# Patient Record
Sex: FEMALE | Race: WHITE | Employment: FULL TIME | URBAN - METROPOLITAN AREA
[De-identification: names, ages, dates, MRNs, and addresses within clinical notes are randomized per-mention and may not be internally consistent; named-entity substitution may affect disease eponyms.]

---

## 2018-05-09 DIAGNOSIS — E03.8 OTHER SPECIFIED HYPOTHYROIDISM: Primary | ICD-10-CM

## 2018-05-16 ENCOUNTER — HOSPITAL ENCOUNTER (OUTPATIENT)
Age: 53
Discharge: HOME | End: 2018-05-16
Payer: COMMERCIAL

## 2018-05-16 DIAGNOSIS — E03.8 OTHER SPECIFIED HYPOTHYROIDISM: ICD-10-CM

## 2018-05-16 LAB — TSH SERPL DL<=0.05 MIU/L-ACNC: 7.35 UIU/ML (ref 0.27–4.2)

## 2018-05-16 PROCEDURE — 84443 ASSAY THYROID STIM HORMONE: CPT

## 2018-05-16 PROCEDURE — 36415 COLL VENOUS BLD VENIPUNCTURE: CPT

## 2018-05-20 DIAGNOSIS — E03.9 ACQUIRED HYPOTHYROIDISM: ICD-10-CM

## 2018-05-20 RX ORDER — LEVOTHYROXINE SODIUM 0.15 MG/1
150 TABLET ORAL DAILY
Qty: 30 TABLET | Refills: 1 | Status: SHIPPED | OUTPATIENT
Start: 2018-05-20 | End: 2018-08-01 | Stop reason: SDUPTHER

## 2018-06-10 ENCOUNTER — OFFICE VISIT (OUTPATIENT)
Dept: FAMILY MEDICINE CLINIC | Age: 53
End: 2018-06-10
Payer: COMMERCIAL

## 2018-06-10 VITALS
RESPIRATION RATE: 18 BRPM | WEIGHT: 247 LBS | BODY MASS INDEX: 39.7 KG/M2 | HEART RATE: 83 BPM | DIASTOLIC BLOOD PRESSURE: 78 MMHG | TEMPERATURE: 98 F | HEIGHT: 66 IN | SYSTOLIC BLOOD PRESSURE: 130 MMHG | OXYGEN SATURATION: 97 %

## 2018-06-10 DIAGNOSIS — B02.9 HERPES ZOSTER WITHOUT COMPLICATION: Primary | ICD-10-CM

## 2018-06-10 PROCEDURE — 3017F COLORECTAL CA SCREEN DOC REV: CPT | Performed by: PHYSICIAN ASSISTANT

## 2018-06-10 PROCEDURE — 3014F SCREEN MAMMO DOC REV: CPT | Performed by: PHYSICIAN ASSISTANT

## 2018-06-10 PROCEDURE — 1036F TOBACCO NON-USER: CPT | Performed by: PHYSICIAN ASSISTANT

## 2018-06-10 PROCEDURE — G8417 CALC BMI ABV UP PARAM F/U: HCPCS | Performed by: PHYSICIAN ASSISTANT

## 2018-06-10 PROCEDURE — 99213 OFFICE O/P EST LOW 20 MIN: CPT | Performed by: PHYSICIAN ASSISTANT

## 2018-06-10 PROCEDURE — G8427 DOCREV CUR MEDS BY ELIG CLIN: HCPCS | Performed by: PHYSICIAN ASSISTANT

## 2018-06-10 RX ORDER — PREDNISONE 20 MG/1
40 TABLET ORAL DAILY
Qty: 10 TABLET | Refills: 0 | Status: SHIPPED | OUTPATIENT
Start: 2018-06-10 | End: 2018-06-15

## 2018-07-09 ENCOUNTER — OFFICE VISIT (OUTPATIENT)
Dept: FAMILY MEDICINE CLINIC | Age: 53
End: 2018-07-09
Payer: COMMERCIAL

## 2018-07-09 ENCOUNTER — HOSPITAL ENCOUNTER (OUTPATIENT)
Age: 53
Discharge: HOME | End: 2018-07-11
Payer: COMMERCIAL

## 2018-07-09 VITALS
DIASTOLIC BLOOD PRESSURE: 73 MMHG | HEART RATE: 78 BPM | TEMPERATURE: 98.6 F | BODY MASS INDEX: 39.7 KG/M2 | RESPIRATION RATE: 14 BRPM | WEIGHT: 247 LBS | SYSTOLIC BLOOD PRESSURE: 128 MMHG | OXYGEN SATURATION: 97 % | HEIGHT: 66 IN

## 2018-07-09 DIAGNOSIS — Z82.49 FAMILY HISTORY OF CORONARY ARTERY DISEASE: ICD-10-CM

## 2018-07-09 DIAGNOSIS — K21.9 GASTROESOPHAGEAL REFLUX DISEASE WITHOUT ESOPHAGITIS: ICD-10-CM

## 2018-07-09 DIAGNOSIS — E03.9 HYPOTHYROIDISM, UNSPECIFIED TYPE: Primary | ICD-10-CM

## 2018-07-09 DIAGNOSIS — E03.9 HYPOTHYROIDISM, UNSPECIFIED TYPE: ICD-10-CM

## 2018-07-09 DIAGNOSIS — H61.22 LEFT EAR IMPACTED CERUMEN: ICD-10-CM

## 2018-07-09 LAB
ALBUMIN SERPL-MCNC: 4.2 G/DL (ref 3.5–5.2)
ALP BLD-CCNC: 73 U/L (ref 35–104)
ALT SERPL-CCNC: 22 U/L (ref 0–32)
ANION GAP SERPL CALCULATED.3IONS-SCNC: 16 MMOL/L (ref 7–16)
AST SERPL-CCNC: 22 U/L (ref 0–31)
BILIRUB SERPL-MCNC: 0.5 MG/DL (ref 0–1.2)
BUN BLDV-MCNC: 10 MG/DL (ref 6–20)
CALCIUM SERPL-MCNC: 9 MG/DL (ref 8.6–10.2)
CHLORIDE BLD-SCNC: 102 MMOL/L (ref 98–107)
CHOLESTEROL, TOTAL: 187 MG/DL (ref 0–199)
CO2: 21 MMOL/L (ref 22–29)
CREAT SERPL-MCNC: 0.7 MG/DL (ref 0.5–1)
GFR AFRICAN AMERICAN: >60
GFR NON-AFRICAN AMERICAN: >60 ML/MIN/1.73
GLUCOSE BLD-MCNC: 92 MG/DL (ref 74–109)
HCT VFR BLD CALC: 43.3 % (ref 34–48)
HDLC SERPL-MCNC: 50 MG/DL
HEMOGLOBIN: 14.2 G/DL (ref 11.5–15.5)
LDL CHOLESTEROL CALCULATED: 117 MG/DL (ref 0–99)
MCH RBC QN AUTO: 28.6 PG (ref 26–35)
MCHC RBC AUTO-ENTMCNC: 32.8 % (ref 32–34.5)
MCV RBC AUTO: 87.3 FL (ref 80–99.9)
PDW BLD-RTO: 14.1 FL (ref 11.5–15)
PLATELET # BLD: 202 E9/L (ref 130–450)
PMV BLD AUTO: 10.7 FL (ref 7–12)
POTASSIUM SERPL-SCNC: 4.6 MMOL/L (ref 3.5–5)
RBC # BLD: 4.96 E12/L (ref 3.5–5.5)
SODIUM BLD-SCNC: 139 MMOL/L (ref 132–146)
TOTAL PROTEIN: 7.2 G/DL (ref 6.4–8.3)
TRIGL SERPL-MCNC: 98 MG/DL (ref 0–149)
TSH SERPL DL<=0.05 MIU/L-ACNC: 1.2 UIU/ML (ref 0.27–4.2)
VLDLC SERPL CALC-MCNC: 20 MG/DL
WBC # BLD: 7 E9/L (ref 4.5–11.5)

## 2018-07-09 PROCEDURE — 3014F SCREEN MAMMO DOC REV: CPT | Performed by: NURSE PRACTITIONER

## 2018-07-09 PROCEDURE — 84443 ASSAY THYROID STIM HORMONE: CPT

## 2018-07-09 PROCEDURE — 80053 COMPREHEN METABOLIC PANEL: CPT

## 2018-07-09 PROCEDURE — 80061 LIPID PANEL: CPT

## 2018-07-09 PROCEDURE — 99214 OFFICE O/P EST MOD 30 MIN: CPT | Performed by: NURSE PRACTITIONER

## 2018-07-09 PROCEDURE — G8428 CUR MEDS NOT DOCUMENT: HCPCS | Performed by: NURSE PRACTITIONER

## 2018-07-09 PROCEDURE — 1036F TOBACCO NON-USER: CPT | Performed by: NURSE PRACTITIONER

## 2018-07-09 PROCEDURE — G8417 CALC BMI ABV UP PARAM F/U: HCPCS | Performed by: NURSE PRACTITIONER

## 2018-07-09 PROCEDURE — 3017F COLORECTAL CA SCREEN DOC REV: CPT | Performed by: NURSE PRACTITIONER

## 2018-07-09 PROCEDURE — 85027 COMPLETE CBC AUTOMATED: CPT

## 2018-07-09 RX ORDER — RANITIDINE 150 MG/1
150 TABLET ORAL NIGHTLY
Qty: 30 TABLET | Refills: 3 | Status: SHIPPED | OUTPATIENT
Start: 2018-07-09 | End: 2019-09-03 | Stop reason: SDUPTHER

## 2018-07-10 ASSESSMENT — ENCOUNTER SYMPTOMS
EYE REDNESS: 0
SORE THROAT: 0
WHEEZING: 0
EYE ITCHING: 0
APNEA: 0
COUGH: 0
CHOKING: 0
EYE PAIN: 0
TROUBLE SWALLOWING: 0
RHINORRHEA: 0
SINUS PAIN: 0
VOICE CHANGE: 0
VOMITING: 0
SHORTNESS OF BREATH: 0
ABDOMINAL DISTENTION: 0
CONSTIPATION: 0
STRIDOR: 0
RECTAL PAIN: 0
COLOR CHANGE: 0
EYE DISCHARGE: 0
NAUSEA: 0
DIARRHEA: 0
SINUS PRESSURE: 0
CHEST TIGHTNESS: 0
PHOTOPHOBIA: 0
ANAL BLEEDING: 0
BLOOD IN STOOL: 0
ABDOMINAL PAIN: 0

## 2018-07-10 NOTE — PROGRESS NOTES
peripheral edema   Pulmonary/Chest: Effort normal and breath sounds normal. No respiratory distress. She has no wheezes. She has no rales. She exhibits no tenderness. Abdominal: Soft. Bowel sounds are normal. She exhibits no distension and no mass. There is tenderness (mild epigastric tenderness). There is no rebound and no guarding. Lymphadenopathy:     She has no cervical adenopathy. Neurological: She is alert and oriented to person, place, and time. Skin: Skin is warm and dry. No rash noted. She is not diaphoretic. No erythema. No pallor. Psychiatric: She has a normal mood and affect. Her behavior is normal. Judgment and thought content normal.   Nursing note and vitals reviewed. Assessment / Plan:      Jason Brown was seen today for hypothyroidism, gastroesophageal reflux and otalgia. Diagnoses and all orders for this visit:    Hypothyroidism, unspecified type  Continue current dosage of Synthroid until new TSH is reviewed  -     CBC; Future  -     TSH without Reflex; Future  -     Comprehensive Metabolic Panel; Future  -     Lipid Panel; Future    Gastroesophageal reflux disease without esophagitis  Will start Zantac  Advised on GERD prevention diet  -     ranitidine (ZANTAC) 150 MG tablet; Take 1 tablet by mouth nightly    Family history of coronary artery disease  -     CBC; Future  -     TSH without Reflex; Future  -     Comprehensive Metabolic Panel; Future  -     Lipid Panel; Future    Left ear impacted cerumen  Cerumen successfully removed via water irrigation/flush  -     Ear wax removal         Call or go to ED immediately if symptoms worsen or persist.    Return for f/u will be based on labs once received., or sooner if necessary. Educational materials and/or home exercises printed for patient's review and were included in patient instructions on his/her After Visit Summary and given to patient at the end of visit.       Counseled regarding above diagnosis, including possible risks and complications,  especially if left uncontrolled. Counseled regarding the possible side effects, risks, benefits and alternatives to treatment; patient and/or guardian verbalizes understanding, agrees, feels comfortable with and wishes to proceed with above treatment plan. Advised patient to call with any new medication issues, and read all Rx info from pharmacy to assure aware of all possible risks and side effects of medication before taking. Reviewed age and gender appropriate health screening exams and vaccinations. Advised patient regarding importance of keeping up with recommended health maintenance and to schedule as soon as possible if overdue, as this is important in assessing for undiagnosed pathology, especially cancer, as well as protecting against potentially harmful/life threatening disease. Patient and/or guardian verbalizes understanding and agrees with above counseling, assessment and plan. All questions answered.     Helen Mistry, RAMIN - CNP

## 2018-08-01 DIAGNOSIS — E03.9 ACQUIRED HYPOTHYROIDISM: ICD-10-CM

## 2018-08-01 RX ORDER — LEVOTHYROXINE SODIUM 0.15 MG/1
150 TABLET ORAL DAILY
Qty: 30 TABLET | Refills: 3 | Status: SHIPPED | OUTPATIENT
Start: 2018-08-01 | End: 2018-12-19 | Stop reason: SDUPTHER

## 2018-09-25 ENCOUNTER — TELEPHONE (OUTPATIENT)
Dept: FAMILY MEDICINE CLINIC | Age: 53
End: 2018-09-25

## 2018-09-25 RX ORDER — OMEPRAZOLE 20 MG/1
20 CAPSULE, DELAYED RELEASE ORAL DAILY
Qty: 30 CAPSULE | Refills: 3 | Status: SHIPPED | OUTPATIENT
Start: 2018-09-25 | End: 2018-12-19

## 2018-09-25 NOTE — TELEPHONE ENCOUNTER
Patient has been on Zantac for a couple of months, not helping, states she is having a lot of burning.  Is there something else she can try? [phone]

## 2018-12-19 ENCOUNTER — HOSPITAL ENCOUNTER (OUTPATIENT)
Age: 53
Discharge: HOME | End: 2018-12-21
Payer: COMMERCIAL

## 2018-12-19 ENCOUNTER — OFFICE VISIT (OUTPATIENT)
Dept: FAMILY MEDICINE CLINIC | Age: 53
End: 2018-12-19
Payer: COMMERCIAL

## 2018-12-19 VITALS
TEMPERATURE: 98 F | HEIGHT: 66 IN | SYSTOLIC BLOOD PRESSURE: 128 MMHG | RESPIRATION RATE: 18 BRPM | DIASTOLIC BLOOD PRESSURE: 88 MMHG | HEART RATE: 85 BPM | OXYGEN SATURATION: 96 % | WEIGHT: 242 LBS | BODY MASS INDEX: 38.89 KG/M2

## 2018-12-19 DIAGNOSIS — B35.4 TINEA CORPORIS: ICD-10-CM

## 2018-12-19 DIAGNOSIS — B96.89 ACUTE BACTERIAL SINUSITIS: Primary | ICD-10-CM

## 2018-12-19 DIAGNOSIS — Z51.81 THERAPEUTIC DRUG MONITORING: ICD-10-CM

## 2018-12-19 DIAGNOSIS — J01.90 ACUTE BACTERIAL SINUSITIS: Primary | ICD-10-CM

## 2018-12-19 LAB
ALBUMIN SERPL-MCNC: 4.3 G/DL (ref 3.5–5.2)
ALP BLD-CCNC: 83 U/L (ref 35–104)
ALT SERPL-CCNC: 19 U/L (ref 0–32)
ANION GAP SERPL CALCULATED.3IONS-SCNC: 15 MMOL/L (ref 7–16)
AST SERPL-CCNC: 22 U/L (ref 0–31)
BILIRUB SERPL-MCNC: 0.5 MG/DL (ref 0–1.2)
BUN BLDV-MCNC: 11 MG/DL (ref 6–20)
CALCIUM SERPL-MCNC: 9.3 MG/DL (ref 8.6–10.2)
CHLORIDE BLD-SCNC: 102 MMOL/L (ref 98–107)
CO2: 22 MMOL/L (ref 22–29)
CREAT SERPL-MCNC: 0.7 MG/DL (ref 0.5–1)
GFR AFRICAN AMERICAN: >60
GFR NON-AFRICAN AMERICAN: >60 ML/MIN/1.73
GLUCOSE BLD-MCNC: 117 MG/DL (ref 74–99)
POTASSIUM SERPL-SCNC: 4.8 MMOL/L (ref 3.5–5)
SODIUM BLD-SCNC: 139 MMOL/L (ref 132–146)
TOTAL PROTEIN: 7.5 G/DL (ref 6.4–8.3)

## 2018-12-19 PROCEDURE — 3014F SCREEN MAMMO DOC REV: CPT | Performed by: NURSE PRACTITIONER

## 2018-12-19 PROCEDURE — G8484 FLU IMMUNIZE NO ADMIN: HCPCS | Performed by: NURSE PRACTITIONER

## 2018-12-19 PROCEDURE — G8417 CALC BMI ABV UP PARAM F/U: HCPCS | Performed by: NURSE PRACTITIONER

## 2018-12-19 PROCEDURE — 1036F TOBACCO NON-USER: CPT | Performed by: NURSE PRACTITIONER

## 2018-12-19 PROCEDURE — 3017F COLORECTAL CA SCREEN DOC REV: CPT | Performed by: NURSE PRACTITIONER

## 2018-12-19 PROCEDURE — G8427 DOCREV CUR MEDS BY ELIG CLIN: HCPCS | Performed by: NURSE PRACTITIONER

## 2018-12-19 PROCEDURE — 99214 OFFICE O/P EST MOD 30 MIN: CPT | Performed by: NURSE PRACTITIONER

## 2018-12-19 PROCEDURE — 80053 COMPREHEN METABOLIC PANEL: CPT

## 2018-12-19 RX ORDER — LEVOTHYROXINE SODIUM 0.15 MG/1
150 TABLET ORAL DAILY
Qty: 30 TABLET | Refills: 5 | Status: SHIPPED | OUTPATIENT
Start: 2018-12-19 | End: 2019-11-24 | Stop reason: DRUGHIGH

## 2018-12-19 RX ORDER — DOXYCYCLINE HYCLATE 100 MG
100 TABLET ORAL 2 TIMES DAILY
Qty: 14 TABLET | Refills: 0 | Status: SHIPPED | OUTPATIENT
Start: 2018-12-19 | End: 2018-12-26

## 2018-12-19 ASSESSMENT — ENCOUNTER SYMPTOMS
PHOTOPHOBIA: 0
EYE ITCHING: 0
EYE REDNESS: 0
RHINORRHEA: 1
STRIDOR: 0
NAUSEA: 0
TROUBLE SWALLOWING: 0
VOMITING: 0
SHORTNESS OF BREATH: 0
EYE DISCHARGE: 0
SORE THROAT: 0
SINUS PAIN: 1
COUGH: 1
EYE PAIN: 0
WHEEZING: 0
DIARRHEA: 0
SINUS PRESSURE: 1
VOICE CHANGE: 0
ABDOMINAL PAIN: 0
COLOR CHANGE: 0

## 2018-12-19 ASSESSMENT — PATIENT HEALTH QUESTIONNAIRE - PHQ9
SUM OF ALL RESPONSES TO PHQ QUESTIONS 1-9: 0
2. FEELING DOWN, DEPRESSED OR HOPELESS: 0
1. LITTLE INTEREST OR PLEASURE IN DOING THINGS: 0
SUM OF ALL RESPONSES TO PHQ9 QUESTIONS 1 & 2: 0
SUM OF ALL RESPONSES TO PHQ QUESTIONS 1-9: 0

## 2018-12-19 NOTE — PROGRESS NOTES
Jamie Espinosa is a 46 y.o. female who presents today for   Chief Complaint   Patient presents with    Rash     BLE/Foot    Sinus Problem         HPI    Rash  Pt c/o itchy circular rash to BLE and right foot that has been present for > 2 months, Pt stated rash started o right foot then has spread to her legs. Denies any other family member with similar rash. Pt was treated with antifungal cream approximately 1 yr ago for fungal rash. Sinusitis  Patient complains of congestion, cough, mouth breathing, nasal congestion, post nasal drip and purulent rhinorrhea. Onset of symptoms was several days ago. Symptoms have been gradually worsening since that time. She is drinking plenty of fluids. Past history is significant for nothing. Patient is non-smoker. [address]:  Patient's past medical, surgical, social and/or family history reviewed, updated in chart, and are non-contributory (unless otherwise stated). Medications and allergies also reviewed and updated in chart. Review of Systems  Review of Systems   Constitutional: Negative for activity change, appetite change, chills, diaphoresis, fatigue and fever. HENT: Positive for congestion, postnasal drip, rhinorrhea, sinus pain and sinus pressure. Negative for ear discharge, ear pain, mouth sores, nosebleeds, sneezing, sore throat, trouble swallowing and voice change. Eyes: Negative for photophobia, pain, discharge, redness, itching and visual disturbance. Respiratory: Positive for cough. Negative for shortness of breath, wheezing and stridor. Cardiovascular: Negative for chest pain, palpitations and leg swelling. Gastrointestinal: Negative for abdominal pain, diarrhea, nausea and vomiting. Skin: Positive for rash. Negative for color change and pallor.        Physical Exam:    VS:  /88 (Site: Right Upper Arm, Position: Sitting, Cuff Size: Large Adult)   Pulse 85   Temp 98 °F (36.7 °C) (Oral)   Resp 18   Ht 5' 6\" (1.676 m)   Wt Call or go to ED immediately if symptoms worsen or persist.    Return if symptoms worsen or fail to improve. , or sooner if necessary. Educational materials and/or home exercises printed for patient's review and were included in patient instructions on his/her After Visit Summary and given to patient at the end of visit. Counseled regarding above diagnosis, including possible risks and complications,  especially if left uncontrolled. Counseled regarding the possible side effects, risks, benefits and alternatives to treatment; patient and/or guardian verbalizes understanding, agrees, feels comfortable with and wishes to proceed with above treatment plan. Advised patient to call with any new medication issues, and read all Rx info from pharmacy to assure aware of all possible risks and side effects of medication before taking. Reviewed age and gender appropriate health screening exams and vaccinations. Advised patient regarding importance of keeping up with recommended health maintenance and to schedule as soon as possible if overdue, as this is important in assessing for undiagnosed pathology, especially cancer, as well as protecting against potentially harmful/life threatening disease. Patient and/or guardian verbalizes understanding and agrees with above counseling, assessment and plan. All questions answered.     Jovita Ivory, APRN - CNP

## 2018-12-20 RX ORDER — GRISEOFULVIN 500 MG/1
500 TABLET ORAL DAILY
Qty: 14 TABLET | Refills: 1 | Status: SHIPPED | OUTPATIENT
Start: 2018-12-20 | End: 2019-01-03

## 2019-09-03 DIAGNOSIS — K21.9 GASTROESOPHAGEAL REFLUX DISEASE WITHOUT ESOPHAGITIS: ICD-10-CM

## 2019-09-03 DIAGNOSIS — E03.9 ACQUIRED HYPOTHYROIDISM: Primary | ICD-10-CM

## 2019-09-03 RX ORDER — RANITIDINE 150 MG/1
TABLET ORAL
Qty: 30 TABLET | Refills: 2 | Status: SHIPPED | OUTPATIENT
Start: 2019-09-03 | End: 2020-01-06

## 2019-09-03 RX ORDER — LEVOTHYROXINE SODIUM 0.15 MG/1
TABLET ORAL
Qty: 30 TABLET | Refills: 4 | OUTPATIENT
Start: 2019-09-03

## 2019-11-21 ENCOUNTER — HOSPITAL ENCOUNTER (OUTPATIENT)
Age: 54
Discharge: HOME | End: 2019-11-21

## 2019-11-21 PROCEDURE — 36415 COLL VENOUS BLD VENIPUNCTURE: CPT

## 2019-11-21 PROCEDURE — 84443 ASSAY THYROID STIM HORMONE: CPT

## 2019-11-22 LAB — TSH SERPL DL<=0.05 MIU/L-ACNC: 114.9 UIU/ML (ref 0.27–4.2)

## 2019-11-24 RX ORDER — LEVOTHYROXINE SODIUM 0.15 MG/1
150 TABLET ORAL DAILY
Qty: 30 TABLET | Refills: 5 | OUTPATIENT
Start: 2019-11-24

## 2019-11-24 RX ORDER — LEVOTHYROXINE SODIUM 0.05 MG/1
50 TABLET ORAL DAILY
Qty: 30 TABLET | Refills: 0 | Status: SHIPPED | OUTPATIENT
Start: 2019-11-24 | End: 2020-01-07 | Stop reason: DRUGHIGH

## 2020-01-06 ENCOUNTER — HOSPITAL ENCOUNTER (OUTPATIENT)
Age: 55
Discharge: HOME | End: 2020-01-08
Payer: COMMERCIAL

## 2020-01-06 ENCOUNTER — OFFICE VISIT (OUTPATIENT)
Dept: FAMILY MEDICINE CLINIC | Age: 55
End: 2020-01-06

## 2020-01-06 VITALS
WEIGHT: 248.2 LBS | OXYGEN SATURATION: 98 % | RESPIRATION RATE: 16 BRPM | HEART RATE: 76 BPM | TEMPERATURE: 97.9 F | BODY MASS INDEX: 39.89 KG/M2 | HEIGHT: 66 IN | SYSTOLIC BLOOD PRESSURE: 134 MMHG | DIASTOLIC BLOOD PRESSURE: 85 MMHG

## 2020-01-06 PROCEDURE — 84443 ASSAY THYROID STIM HORMONE: CPT

## 2020-01-06 PROCEDURE — 99213 OFFICE O/P EST LOW 20 MIN: CPT | Performed by: NURSE PRACTITIONER

## 2020-01-06 RX ORDER — OMEPRAZOLE 20 MG/1
20 CAPSULE, DELAYED RELEASE ORAL DAILY
Qty: 30 CAPSULE | Refills: 5 | Status: SHIPPED
Start: 2020-01-06 | End: 2020-06-01 | Stop reason: SDUPTHER

## 2020-01-06 ASSESSMENT — PATIENT HEALTH QUESTIONNAIRE - PHQ9
SUM OF ALL RESPONSES TO PHQ9 QUESTIONS 1 & 2: 0
1. LITTLE INTEREST OR PLEASURE IN DOING THINGS: 0
2. FEELING DOWN, DEPRESSED OR HOPELESS: 0
SUM OF ALL RESPONSES TO PHQ QUESTIONS 1-9: 0
SUM OF ALL RESPONSES TO PHQ QUESTIONS 1-9: 0

## 2020-01-06 NOTE — PROGRESS NOTES
Yessy Schulte is a 53 y.o. female who presents today for   Chief Complaint   Patient presents with    Hypothyroidism         HPI    Hypothyroidism: Recent symptoms: fatigue. She denies cold intolerance, heat intolerance, hair loss, diarrhea, edema and anxiety. Patient is  taking her medication consistently on an empty stomach. Pt is due for repeat TSH today      Lab Results   Component Value Date    .900 (H) 11/21/2019    TSH 1.200 07/09/2018    TSH 7.350 (H) 05/16/2018           [address]:  Patient's past medical, surgical, social and/or family history reviewed, updated in chart, and are non-contributory (unless otherwise stated). Medications and allergies also reviewed and updated in chart. Review of Systems  Review of Systems   Constitutional: Positive for fatigue. Negative for chills, diaphoresis and fever. HENT: Negative for trouble swallowing and voice change. Eyes: Negative for photophobia, pain, discharge, redness, itching and visual disturbance. Respiratory: Negative for cough, choking, chest tightness, shortness of breath, wheezing and stridor. Cardiovascular: Negative for chest pain, palpitations and leg swelling. Endocrine: Negative for cold intolerance, heat intolerance, polydipsia, polyphagia and polyuria. H/o hypothyroid   Skin: Negative for color change, pallor and rash. Physical Exam:    VS:  /85 (Site: Left Upper Arm, Position: Sitting, Cuff Size: Large Adult)   Pulse 76   Temp 97.9 °F (36.6 °C)   Resp 16   Ht 5' 6\" (1.676 m)   Wt 248 lb 3.2 oz (112.6 kg)   LMP 03/15/2015   SpO2 98%   BMI 40.06 kg/m²   LAST WEIGHT:  Wt Readings from Last 3 Encounters:   01/06/20 248 lb 3.2 oz (112.6 kg)   12/19/18 242 lb (109.8 kg)   07/09/18 247 lb (112 kg)     Physical Exam  Vitals signs and nursing note reviewed. Constitutional:       General: She is not in acute distress. Appearance: Normal appearance.  She is not ill-appearing, toxic-appearing or verbalizes understanding, agrees, feels comfortable with and wishes to proceed with above treatment plan. Advised patient to call with any new medication issues, and read all Rx info from pharmacy to assure aware of all possible risks and side effects of medication before taking. Reviewed age and gender appropriate health screening exams and vaccinations. Advised patient regarding importance of keeping up with recommended health maintenance and to schedule as soon as possible if overdue, as this is important in assessing for undiagnosed pathology, especially cancer, as well as protecting against potentially harmful/life threatening disease. Patient and/or guardian verbalizes understanding and agrees with above counseling, assessment and plan. All questions answered.     Maribell Medina, APRN - CNP

## 2020-01-07 LAB — TSH SERPL DL<=0.05 MIU/L-ACNC: 63.73 UIU/ML (ref 0.27–4.2)

## 2020-01-07 RX ORDER — LEVOTHYROXINE SODIUM 0.07 MG/1
75 TABLET ORAL DAILY
Qty: 30 TABLET | Refills: 0 | Status: SHIPPED
Start: 2020-01-07 | End: 2020-02-11 | Stop reason: DRUGHIGH

## 2020-01-08 ASSESSMENT — ENCOUNTER SYMPTOMS
CHOKING: 0
COUGH: 0
EYE REDNESS: 0
COLOR CHANGE: 0
TROUBLE SWALLOWING: 0
EYE PAIN: 0
PHOTOPHOBIA: 0
WHEEZING: 0
EYE ITCHING: 0
EYE DISCHARGE: 0
SHORTNESS OF BREATH: 0
CHEST TIGHTNESS: 0
STRIDOR: 0
VOICE CHANGE: 0

## 2020-02-10 ENCOUNTER — HOSPITAL ENCOUNTER (OUTPATIENT)
Age: 55
Discharge: HOME | End: 2020-02-10
Payer: COMMERCIAL

## 2020-02-10 PROCEDURE — 36415 COLL VENOUS BLD VENIPUNCTURE: CPT

## 2020-02-10 PROCEDURE — 84443 ASSAY THYROID STIM HORMONE: CPT

## 2020-02-11 LAB — TSH SERPL DL<=0.05 MIU/L-ACNC: 34.25 UIU/ML (ref 0.27–4.2)

## 2020-02-11 RX ORDER — LEVOTHYROXINE SODIUM 0.1 MG/1
100 TABLET ORAL DAILY
Qty: 30 TABLET | Refills: 0 | Status: SHIPPED
Start: 2020-02-11 | End: 2020-03-12 | Stop reason: DRUGHIGH

## 2020-03-11 ENCOUNTER — HOSPITAL ENCOUNTER (OUTPATIENT)
Age: 55
Discharge: HOME | End: 2020-03-11
Payer: COMMERCIAL

## 2020-03-11 PROCEDURE — 36415 COLL VENOUS BLD VENIPUNCTURE: CPT

## 2020-03-11 PROCEDURE — 84443 ASSAY THYROID STIM HORMONE: CPT

## 2020-03-12 LAB — TSH SERPL DL<=0.05 MIU/L-ACNC: 10.3 UIU/ML (ref 0.27–4.2)

## 2020-03-12 RX ORDER — LEVOTHYROXINE SODIUM 0.12 MG/1
125 TABLET ORAL DAILY
Qty: 30 TABLET | Refills: 0 | Status: SHIPPED
Start: 2020-03-12 | End: 2020-04-21 | Stop reason: SDUPTHER

## 2020-04-14 ENCOUNTER — TELEPHONE (OUTPATIENT)
Dept: FAMILY MEDICINE CLINIC | Age: 55
End: 2020-04-14

## 2020-04-14 RX ORDER — LEVOTHYROXINE SODIUM 0.12 MG/1
125 TABLET ORAL DAILY
Qty: 30 TABLET | Refills: 0 | Status: CANCELLED | OUTPATIENT
Start: 2020-04-14

## 2020-04-14 NOTE — TELEPHONE ENCOUNTER
Pt calling for refill on Levothyroxine. She states she doesn't want to go to the lab to have bloodwork done at this time d/t virus being around.

## 2020-04-21 ENCOUNTER — HOSPITAL ENCOUNTER (OUTPATIENT)
Age: 55
Discharge: HOME | End: 2020-04-21
Payer: COMMERCIAL

## 2020-04-21 LAB — TSH SERPL DL<=0.05 MIU/L-ACNC: 1.74 UIU/ML (ref 0.27–4.2)

## 2020-04-21 PROCEDURE — 84443 ASSAY THYROID STIM HORMONE: CPT

## 2020-04-21 PROCEDURE — 36415 COLL VENOUS BLD VENIPUNCTURE: CPT

## 2020-04-21 RX ORDER — LEVOTHYROXINE SODIUM 0.12 MG/1
125 TABLET ORAL DAILY
Qty: 30 TABLET | Refills: 5 | Status: SHIPPED
Start: 2020-04-21 | End: 2020-06-01 | Stop reason: SDUPTHER

## 2020-06-01 ENCOUNTER — TELEPHONE (OUTPATIENT)
Dept: FAMILY MEDICINE CLINIC | Age: 55
End: 2020-06-01

## 2020-06-01 RX ORDER — LEVOTHYROXINE SODIUM 0.12 MG/1
125 TABLET ORAL DAILY
Qty: 90 TABLET | Refills: 1 | Status: SHIPPED
Start: 2020-06-01 | End: 2021-02-01

## 2020-06-01 RX ORDER — OMEPRAZOLE 20 MG/1
20 CAPSULE, DELAYED RELEASE ORAL DAILY
Qty: 90 CAPSULE | Refills: 1 | Status: SHIPPED
Start: 2020-06-01 | End: 2021-05-26 | Stop reason: ALTCHOICE

## 2021-03-08 ENCOUNTER — OFFICE VISIT (OUTPATIENT)
Dept: FAMILY MEDICINE CLINIC | Age: 56
End: 2021-03-08
Payer: COMMERCIAL

## 2021-03-08 VITALS
TEMPERATURE: 97.3 F | HEIGHT: 66 IN | HEART RATE: 75 BPM | RESPIRATION RATE: 16 BRPM | SYSTOLIC BLOOD PRESSURE: 118 MMHG | WEIGHT: 236 LBS | BODY MASS INDEX: 37.93 KG/M2 | OXYGEN SATURATION: 98 % | DIASTOLIC BLOOD PRESSURE: 72 MMHG

## 2021-03-08 DIAGNOSIS — E78.2 MIXED HYPERLIPIDEMIA: ICD-10-CM

## 2021-03-08 DIAGNOSIS — R73.09 ELEVATED GLUCOSE: ICD-10-CM

## 2021-03-08 DIAGNOSIS — E55.9 VITAMIN D DEFICIENCY: ICD-10-CM

## 2021-03-08 DIAGNOSIS — K21.9 GASTROESOPHAGEAL REFLUX DISEASE WITHOUT ESOPHAGITIS: ICD-10-CM

## 2021-03-08 DIAGNOSIS — E03.9 ACQUIRED HYPOTHYROIDISM: Primary | ICD-10-CM

## 2021-03-08 DIAGNOSIS — L30.9 DERMATITIS: ICD-10-CM

## 2021-03-08 PROCEDURE — 99215 OFFICE O/P EST HI 40 MIN: CPT | Performed by: FAMILY MEDICINE

## 2021-03-08 RX ORDER — OXYBUTYNIN CHLORIDE 5 MG/1
1 TABLET ORAL 2 TIMES DAILY
COMMUNITY
Start: 2021-01-18 | End: 2022-05-23

## 2021-03-08 RX ORDER — FAMOTIDINE 20 MG/1
20 TABLET, FILM COATED ORAL 2 TIMES DAILY PRN
Qty: 60 TABLET | Refills: 3 | Status: SHIPPED
Start: 2021-03-08 | End: 2021-05-24 | Stop reason: SDUPTHER

## 2021-03-08 RX ORDER — TRIAMCINOLONE ACETONIDE 1 MG/G
CREAM TOPICAL
Qty: 80 G | Refills: 1 | Status: SHIPPED
Start: 2021-03-08 | End: 2022-05-23

## 2021-03-08 ASSESSMENT — PATIENT HEALTH QUESTIONNAIRE - PHQ9
1. LITTLE INTEREST OR PLEASURE IN DOING THINGS: 0
SUM OF ALL RESPONSES TO PHQ9 QUESTIONS 1 & 2: 1
2. FEELING DOWN, DEPRESSED OR HOPELESS: 1

## 2021-03-08 NOTE — PATIENT INSTRUCTIONS
Try CeraVe cream twice daily. You can use the steroid cream up to 10-14 days. Let me know if no improvement. Try to consistent with the prilosec. Can take the pepcid as needed. Increase your fiber and water.

## 2021-03-08 NOTE — PROGRESS NOTES
Subjective:  63 y.o. y/o female is here today to follow up chronic hypothyroidism and to newly establish, seen last by [facility] APRN-CNP. This is not generally controlled due to poor compliance, but been taking medication as directed and tolerates well for the past 4-5 months. Symptoms from thyroid standpoint include none. Due for lab. Gyn: Dr. Medina Hodge, mammogram 1/21, overactive bladder, newly started oxybutynin (not taken regularly), s/p hysterectomy  Rash, 4 months, started on left forearm , flexor surface, started on right couple months later, tried lotion and aquaphor and didn't help, itchy  Usually always wearing long sleeves, nothing where leaning on it  No change in diet, no change in products  Derm, saw Dr. Denice Waggoner in the past for issues on legs  Occ chest pains, thinks related to stomach and stress, feels heaviness in chest, off/on issue, stress test 9 years ago negative, moreso with eating and not exertion, prilosec 20mg prn (with symptoms)  Colonoscopy and EGD, 1/17, Dr. Preeti Prieto, 10 years  Elevated cholesterol and glucose previously on lab  Vit D very low previously, occ takes vit D OTC  Takes care of grandson  Lives at home with  and grandson (5)  Doesn't usually get a flu shot    Unsure last tetanus    Patient's past medical, surgical, social and/or family history reviewed, updated in chart, and are non-contributory (unless otherwise stated). Medications and allergies also reviewed and updated in chart.       Review of Systems:  Constitutional:  No fever, no fatigue, no chills, no headaches, no weight change  Dermatology:  + rash, no mole, no dry or sensitive skin  ENT:  No cough, no sore throat, no sinus pain, + runny nose, no ear pain, +congestion off/on (claritin prn)  Cardiology:  + chest pain, no palpitations, +occ leg edema (varicose veins), no shortness of breath, no PND  Gastroenterology:  No dysphagia, +occ abdominal pain, no nausea, no vomiting, + constipation, + diarrhea, + heartburn  Musculoskeletal:  + joint pain (knees), no leg cramps, + back pain, no muscle aches  Respiratory:  No shortness of breath, no orthopnea, no wheezing, no HAQUE  Urology:  No blood in the urine, + urinary frequency, no urinary incontinence, no urinary urgency, no nocturia, no dysuria    Vitals:    03/08/21 1340   BP: 118/72   Site: Left Upper Arm   Position: Sitting   Cuff Size: Large Adult   Pulse: 75   Resp: 16   Temp: 97.3 °F (36.3 °C)   TempSrc: Temporal   SpO2: 98%   Weight: 236 lb (107 kg)   Height: 5' 6\" (1.676 m)     Body mass index is 38.09 kg/m². General:  Patient alert and oriented x 3, NAD, pleasant, overweight-appearing  HEENT:  Atraumatic, normocephalic, pupils equal and normal, EOMI, clear conjunctiva, b/l TMs and ear canals normal, +mask  Neck:  Supple, no goiter or nodules or tenderness, no carotid bruits, no LAD  Lungs:  CTA B, symmetric breath sounds, no resp distress  Heart:  RRR, no murmurs, gallops or rubs  Abdomen:  Soft/nt/nd, + bowel sounds  Extremities:  No clubbing, cyanosis or edema, DTRs normal, no tremors  Skin: +varicose veins in legs, left ulnar surface with 10x5cm rash (slightly raised, smooth, erythematous, excoriations), right ulnar surface with 5x2cm rash (same appearance)    Assessment/Plan:      Nick Cavanaugh was seen today for establish care, hypothyroidism and rash. Diagnoses and all orders for this visit:    Acquired hypothyroidism, no symptoms  -     TSH without Reflex; Future  + Check TSH, continue same dosing for now    Vitamin D deficiency, due for recheck  -     Vitamin D 25 Hydroxy; Future    Gastroesophageal reflux disease without esophagitis, +CP after eating  -     famotidine (PEPCID) 20 MG tablet; Take 1 tablet by mouth 2 times daily as needed (heartburn and pain)  -     Magnesium;  Future  + Recommended trial of daily PPI and then pepcid for symptoms as needed  + Avoid food triggers  + If chest pain continues, will need to again r/o cardiac etiology. Elevated glucose  -     Hemoglobin A1C; Future    Mixed hyperlipidemia, diet-controlled  -     Comprehensive Metabolic Panel; Future  -     CBC Auto Differential; Future  -     Lipid Panel; Future  + Dietary and lifestyle modifications    Dermatitis  -     triamcinolone (KENALOG) 0.1 % cream; Apply topically 2 times daily.  + Seems like a contact dermatitis vs eczema, maybe psoriasis  + Trial of steroid cream for 10-14 days max  + Suggested CeraVe cream BID    Return Wednesday for above fasting lab. As above. Call or go to ED immediately if symptoms worsen or persist.  Return in about 3 months (around 6/8/2021). , or sooner if necessary. Spent over 40 minutes in direct and in-direct care of the patient on the day of her visit. Educational materials and/or home exercises printed for patient's review and were included in patient instructions on his/her After Visit Summary and given to patient at the end of visit. Counseled regarding above diagnosis, including possible risks and complications,  especially if left uncontrolled. Counseled regarding the possible side effects, risks, benefits and alternatives to treatment; patient and/or guardian verbalizes understanding, agrees, feels comfortable with and wishes to proceed with above treatment plan. Advised patient to call with any new medication issues, and read all Rx info from pharmacy to assure aware of all possible risks and side effects of medication before taking. Reviewed age and gender appropriate health screening exams and vaccinations. Advised patient regarding importance of keeping up with recommended health maintenance and to schedule as soon as possible if overdue, as this is important in assessing for undiagnosed pathology, especially cancer, as well as protecting against potentially harmful/life threatening disease.         Patient and/or guardian verbalizes understanding and agrees with above counseling, assessment

## 2021-03-10 ENCOUNTER — NURSE ONLY (OUTPATIENT)
Dept: FAMILY MEDICINE CLINIC | Age: 56
End: 2021-03-10
Payer: COMMERCIAL

## 2021-03-10 DIAGNOSIS — E55.9 VITAMIN D DEFICIENCY: ICD-10-CM

## 2021-03-10 DIAGNOSIS — E78.2 MIXED HYPERLIPIDEMIA: ICD-10-CM

## 2021-03-10 DIAGNOSIS — K21.9 GASTROESOPHAGEAL REFLUX DISEASE WITHOUT ESOPHAGITIS: ICD-10-CM

## 2021-03-10 DIAGNOSIS — R73.09 ELEVATED GLUCOSE: ICD-10-CM

## 2021-03-10 DIAGNOSIS — G47.00 INSOMNIA, UNSPECIFIED TYPE: ICD-10-CM

## 2021-03-10 DIAGNOSIS — E03.9 ACQUIRED HYPOTHYROIDISM: ICD-10-CM

## 2021-03-10 LAB
ALBUMIN SERPL-MCNC: 4.4 G/DL (ref 3.5–5.2)
ALP BLD-CCNC: 67 U/L (ref 35–104)
ALT SERPL-CCNC: 17 U/L (ref 0–32)
ANION GAP SERPL CALCULATED.3IONS-SCNC: 12 MMOL/L (ref 7–16)
AST SERPL-CCNC: 17 U/L (ref 0–31)
BASOPHILS ABSOLUTE: 0.06 E9/L (ref 0–0.2)
BASOPHILS RELATIVE PERCENT: 0.8 % (ref 0–2)
BILIRUB SERPL-MCNC: 0.4 MG/DL (ref 0–1.2)
BUN BLDV-MCNC: 9 MG/DL (ref 6–20)
CALCIUM SERPL-MCNC: 8.7 MG/DL (ref 8.6–10.2)
CHLORIDE BLD-SCNC: 107 MMOL/L (ref 98–107)
CHOLESTEROL, TOTAL: 160 MG/DL (ref 0–199)
CO2: 24 MMOL/L (ref 22–29)
CREAT SERPL-MCNC: 0.8 MG/DL (ref 0.5–1)
EOSINOPHILS ABSOLUTE: 0.27 E9/L (ref 0.05–0.5)
EOSINOPHILS RELATIVE PERCENT: 3.7 % (ref 0–6)
GFR AFRICAN AMERICAN: >60
GFR NON-AFRICAN AMERICAN: >60 ML/MIN/1.73
GLUCOSE BLD-MCNC: 98 MG/DL (ref 74–99)
HBA1C MFR BLD: 5.1 % (ref 4–5.6)
HCT VFR BLD CALC: 41.6 % (ref 34–48)
HDLC SERPL-MCNC: 51 MG/DL
HEMOGLOBIN: 13.8 G/DL (ref 11.5–15.5)
IMMATURE GRANULOCYTES #: 0.02 E9/L
IMMATURE GRANULOCYTES %: 0.3 % (ref 0–5)
LDL CHOLESTEROL CALCULATED: 94 MG/DL (ref 0–99)
LYMPHOCYTES ABSOLUTE: 1.6 E9/L (ref 1.5–4)
LYMPHOCYTES RELATIVE PERCENT: 22 % (ref 20–42)
MAGNESIUM: 1.8 MG/DL (ref 1.6–2.6)
MCH RBC QN AUTO: 29 PG (ref 26–35)
MCHC RBC AUTO-ENTMCNC: 33.2 % (ref 32–34.5)
MCV RBC AUTO: 87.4 FL (ref 80–99.9)
MONOCYTES ABSOLUTE: 0.44 E9/L (ref 0.1–0.95)
MONOCYTES RELATIVE PERCENT: 6 % (ref 2–12)
NEUTROPHILS ABSOLUTE: 4.89 E9/L (ref 1.8–7.3)
NEUTROPHILS RELATIVE PERCENT: 67.2 % (ref 43–80)
PDW BLD-RTO: 13.2 FL (ref 11.5–15)
PLATELET # BLD: 187 E9/L (ref 130–450)
PMV BLD AUTO: 11.4 FL (ref 7–12)
POTASSIUM SERPL-SCNC: 4.2 MMOL/L (ref 3.5–5)
RBC # BLD: 4.76 E12/L (ref 3.5–5.5)
SODIUM BLD-SCNC: 143 MMOL/L (ref 132–146)
TOTAL PROTEIN: 7.1 G/DL (ref 6.4–8.3)
TRIGL SERPL-MCNC: 74 MG/DL (ref 0–149)
TSH SERPL DL<=0.05 MIU/L-ACNC: 3.84 UIU/ML (ref 0.27–4.2)
VITAMIN D 25-HYDROXY: 28 NG/ML (ref 30–100)
VLDLC SERPL CALC-MCNC: 15 MG/DL
WBC # BLD: 7.3 E9/L (ref 4.5–11.5)

## 2021-03-10 PROCEDURE — 36415 COLL VENOUS BLD VENIPUNCTURE: CPT | Performed by: FAMILY MEDICINE

## 2021-05-24 ENCOUNTER — OFFICE VISIT (OUTPATIENT)
Dept: FAMILY MEDICINE CLINIC | Age: 56
End: 2021-05-24
Payer: COMMERCIAL

## 2021-05-24 VITALS
HEIGHT: 66 IN | HEART RATE: 74 BPM | OXYGEN SATURATION: 99 % | TEMPERATURE: 97.9 F | RESPIRATION RATE: 14 BRPM | SYSTOLIC BLOOD PRESSURE: 136 MMHG | BODY MASS INDEX: 39.37 KG/M2 | WEIGHT: 245 LBS | DIASTOLIC BLOOD PRESSURE: 86 MMHG

## 2021-05-24 DIAGNOSIS — R03.0 PREHYPERTENSION: ICD-10-CM

## 2021-05-24 DIAGNOSIS — J06.9 VIRAL URI: ICD-10-CM

## 2021-05-24 DIAGNOSIS — E55.9 VITAMIN D INSUFFICIENCY: ICD-10-CM

## 2021-05-24 DIAGNOSIS — L30.9 DERMATITIS: ICD-10-CM

## 2021-05-24 DIAGNOSIS — K21.9 GASTROESOPHAGEAL REFLUX DISEASE WITHOUT ESOPHAGITIS: Primary | ICD-10-CM

## 2021-05-24 DIAGNOSIS — E03.9 ACQUIRED HYPOTHYROIDISM: ICD-10-CM

## 2021-05-24 PROCEDURE — 99214 OFFICE O/P EST MOD 30 MIN: CPT | Performed by: FAMILY MEDICINE

## 2021-05-24 RX ORDER — FAMOTIDINE 20 MG/1
20 TABLET, FILM COATED ORAL 2 TIMES DAILY PRN
Qty: 60 TABLET | Refills: 5 | Status: SHIPPED
Start: 2021-05-24 | End: 2022-05-23 | Stop reason: SDUPTHER

## 2021-05-24 RX ORDER — AMOXICILLIN AND CLAVULANATE POTASSIUM 875; 125 MG/1; MG/1
1 TABLET, FILM COATED ORAL 2 TIMES DAILY
COMMUNITY
Start: 2021-05-20 | End: 2021-05-30

## 2021-05-24 RX ORDER — PANTOPRAZOLE SODIUM 40 MG/1
40 TABLET, DELAYED RELEASE ORAL
Qty: 90 TABLET | Refills: 1 | Status: SHIPPED
Start: 2021-05-24 | End: 2022-05-23

## 2021-05-24 NOTE — PROGRESS NOTES
Systems:  Constitutional:  + fever (couple days ago), + fatigue, no chills, + headaches, no weight change  Dermatology:  + rash (improved), no mole, + dry or sensitive skin  ENT:  No cough, no sore throat, + sinus pain, + runny nose, + ear pain, +congestion off/on (claritin prn)  Cardiology:  + chest pain, no palpitations, +occ leg edema (varicose veins), no shortness of breath, no PND  Gastroenterology:  No dysphagia, +occ abdominal pain, no nausea, no vomiting, + constipation, + diarrhea, + heartburn  Musculoskeletal:  + joint pain (knees), no leg cramps, + back pain, no muscle aches  Respiratory:  No shortness of breath, no orthopnea, no wheezing, no HQAUE  Urology:  No blood in the urine, + urinary frequency, no urinary incontinence, no urinary urgency, no nocturia, no dysuria    Vitals:    05/24/21 1405   BP: 136/86   Site: Left Upper Arm   Position: Sitting   Cuff Size: Large Adult   Pulse: 74   Resp: 14   Temp: 97.9 °F (36.6 °C)   TempSrc: Temporal   SpO2: 99%   Weight: 245 lb (111.1 kg)   Height: 5' 6\" (1.676 m)     Body mass index is 39.54 kg/m². General:  Patient alert and oriented x 3, NAD, pleasant, overweight-appearing  HEENT:  Atraumatic, normocephalic, pupils equal and normal, EOMI, clear conjunctiva, b/l TMs and ear canals normal, +sinus tenderness, +mask  Neck:  Supple, no goiter or nodules or tenderness, no carotid bruits, no LAD  Lungs:  CTA B, symmetric breath sounds, no resp distress  Heart:  RRR, no murmurs, gallops or rubs  Abdomen:  Soft/nt/nd, + bowel sounds  Extremities:  No clubbing, cyanosis or edema  Skin: left ulnar surface with 10x5cm rash (slightly raised, smooth, erythematous, excoriations--improved), right ulnar surface with 5x2cm rash (same appearance--improved)    Assessment/Plan:    Tammie Zhang was seen today for hypothyroidism, gastroesophageal reflux, dental problem and concern for covid-19.     Diagnoses and all orders for this visit:    Gastroesophageal reflux disease without esophagitis, uncontrolled  -     famotidine (PEPCID) 20 MG tablet; Take 1 tablet by mouth 2 times daily as needed (heartburn and pain)  -     pantoprazole (PROTONIX) 40 MG tablet; Take 1 tablet by mouth every morning (before breakfast)  + Switch to protonix daily, continue pepcid prn with symptoms  + Continue to monitor    Dermatitis, +improvement with steroids but persistent  Suspect psoriasis vs the eczema   Did recommend seeing her dermatologist  Continue steroid cream prn and regular emolients    Acquired hypothyroidism, controlled  Continue same dosing    Viral URI  Being treated for sinus infection, question COVID d/t symptoms  Strongly recommended getting tested for COVID (unable to do in office)  Needs to quarantine until negative test  Continue claritin and flonase  Continue Augmentin as prescribed as may only be acute sinusitis (but minimal improvement after 4 days of abx)    Prehypertension  + Dietary and lifestyle modifications  + Continue to monitor    Vitamin D insufficiency  Continue OTC supplement      As above. Call or go to ED immediately if symptoms worsen or persist.  Return in about 3 months (around 8/24/2021). , or sooner if necessary. Educational materials and/or home exercises printed for patient's review and were included in patient instructions on his/her After Visit Summary and given to patient at the end of visit. Counseled regarding above diagnosis, including possible risks and complications,  especially if left uncontrolled. Counseled regarding the possible side effects, risks, benefits and alternatives to treatment; patient and/or guardian verbalizes understanding, agrees, feels comfortable with and wishes to proceed with above treatment plan. Advised patient to call with any new medication issues, and read all Rx info from pharmacy to assure aware of all possible risks and side effects of medication before taking.     Reviewed age and gender appropriate health screening exams and vaccinations. Advised patient regarding importance of keeping up with recommended health maintenance and to schedule as soon as possible if overdue, as this is important in assessing for undiagnosed pathology, especially cancer, as well as protecting against potentially harmful/life threatening disease. Patient and/or guardian verbalizes understanding and agrees with above counseling, assessment and plan. All questions answered.

## 2021-06-17 DIAGNOSIS — E03.9 ACQUIRED HYPOTHYROIDISM: ICD-10-CM

## 2021-06-17 RX ORDER — LEVOTHYROXINE SODIUM 0.12 MG/1
TABLET ORAL
Qty: 90 TABLET | Refills: 0 | Status: SHIPPED
Start: 2021-06-17 | End: 2021-09-07

## 2021-06-17 NOTE — TELEPHONE ENCOUNTER
Requested Prescriptions     Pending Prescriptions Disp Refills    levothyroxine (SYNTHROID) 125 MCG tablet 90 tablet 0     Sig: TAKE 1 TABLET DAILY

## 2022-04-18 DIAGNOSIS — E03.9 ACQUIRED HYPOTHYROIDISM: ICD-10-CM

## 2022-04-18 RX ORDER — LEVOTHYROXINE SODIUM 0.12 MG/1
TABLET ORAL
Qty: 30 TABLET | Refills: 0 | Status: SHIPPED
Start: 2022-04-18 | End: 2022-05-12

## 2022-05-12 DIAGNOSIS — E03.9 ACQUIRED HYPOTHYROIDISM: ICD-10-CM

## 2022-05-12 RX ORDER — LEVOTHYROXINE SODIUM 0.12 MG/1
TABLET ORAL
Qty: 30 TABLET | Refills: 0 | Status: SHIPPED
Start: 2022-05-12 | End: 2022-05-23 | Stop reason: SDUPTHER

## 2022-05-23 ENCOUNTER — OFFICE VISIT (OUTPATIENT)
Dept: FAMILY MEDICINE CLINIC | Age: 57
End: 2022-05-23
Payer: COMMERCIAL

## 2022-05-23 VITALS
TEMPERATURE: 98.2 F | DIASTOLIC BLOOD PRESSURE: 90 MMHG | HEART RATE: 68 BPM | HEIGHT: 66 IN | OXYGEN SATURATION: 98 % | SYSTOLIC BLOOD PRESSURE: 134 MMHG | BODY MASS INDEX: 40.66 KG/M2 | WEIGHT: 253 LBS

## 2022-05-23 DIAGNOSIS — Z11.59 ENCOUNTER FOR HEPATITIS C SCREENING TEST FOR LOW RISK PATIENT: ICD-10-CM

## 2022-05-23 DIAGNOSIS — E03.9 ACQUIRED HYPOTHYROIDISM: ICD-10-CM

## 2022-05-23 DIAGNOSIS — Z11.4 ENCOUNTER FOR SCREENING FOR HIV: ICD-10-CM

## 2022-05-23 DIAGNOSIS — R25.2 LEG CRAMPS: ICD-10-CM

## 2022-05-23 DIAGNOSIS — L30.9 ACUTE ECZEMA: ICD-10-CM

## 2022-05-23 DIAGNOSIS — K21.9 GASTROESOPHAGEAL REFLUX DISEASE WITHOUT ESOPHAGITIS: ICD-10-CM

## 2022-05-23 DIAGNOSIS — E03.9 ACQUIRED HYPOTHYROIDISM: Primary | ICD-10-CM

## 2022-05-23 LAB
BASOPHILS ABSOLUTE: 0.04 E9/L (ref 0–0.2)
BASOPHILS RELATIVE PERCENT: 0.5 % (ref 0–2)
EOSINOPHILS ABSOLUTE: 0.33 E9/L (ref 0.05–0.5)
EOSINOPHILS RELATIVE PERCENT: 4.3 % (ref 0–6)
HCT VFR BLD CALC: 45.9 % (ref 34–48)
HEMOGLOBIN: 14.3 G/DL (ref 11.5–15.5)
IMMATURE GRANULOCYTES #: 0.02 E9/L
IMMATURE GRANULOCYTES %: 0.3 % (ref 0–5)
LYMPHOCYTES ABSOLUTE: 1.5 E9/L (ref 1.5–4)
LYMPHOCYTES RELATIVE PERCENT: 19.6 % (ref 20–42)
MCH RBC QN AUTO: 28.3 PG (ref 26–35)
MCHC RBC AUTO-ENTMCNC: 31.2 % (ref 32–34.5)
MCV RBC AUTO: 90.7 FL (ref 80–99.9)
MONOCYTES ABSOLUTE: 0.43 E9/L (ref 0.1–0.95)
MONOCYTES RELATIVE PERCENT: 5.6 % (ref 2–12)
NEUTROPHILS ABSOLUTE: 5.32 E9/L (ref 1.8–7.3)
NEUTROPHILS RELATIVE PERCENT: 69.7 % (ref 43–80)
PDW BLD-RTO: 14.5 FL (ref 11.5–15)
PLATELET # BLD: 185 E9/L (ref 130–450)
PMV BLD AUTO: 11.3 FL (ref 7–12)
RBC # BLD: 5.06 E12/L (ref 3.5–5.5)
WBC # BLD: 7.6 E9/L (ref 4.5–11.5)

## 2022-05-23 PROCEDURE — 99214 OFFICE O/P EST MOD 30 MIN: CPT | Performed by: FAMILY MEDICINE

## 2022-05-23 RX ORDER — LEVOTHYROXINE SODIUM 0.12 MG/1
TABLET ORAL
Qty: 30 TABLET | Refills: 0 | Status: SHIPPED
Start: 2022-05-23 | End: 2022-05-25

## 2022-05-23 RX ORDER — M-VIT,TX,IRON,MINS/CALC/FOLIC 27MG-0.4MG
1 TABLET ORAL DAILY
Qty: 30 TABLET | Refills: 11 | Status: SHIPPED | OUTPATIENT
Start: 2022-05-23 | End: 2023-05-23

## 2022-05-23 RX ORDER — FAMOTIDINE 20 MG/1
20 TABLET, FILM COATED ORAL 2 TIMES DAILY PRN
Qty: 60 TABLET | Refills: 5 | Status: SHIPPED
Start: 2022-05-23 | End: 2022-08-29 | Stop reason: SDUPTHER

## 2022-05-23 RX ORDER — TRIAMCINOLONE ACETONIDE 5 MG/G
CREAM TOPICAL
Qty: 15 G | Refills: 1 | Status: SHIPPED | OUTPATIENT
Start: 2022-05-23 | End: 2022-05-30

## 2022-05-23 SDOH — ECONOMIC STABILITY: FOOD INSECURITY: WITHIN THE PAST 12 MONTHS, YOU WORRIED THAT YOUR FOOD WOULD RUN OUT BEFORE YOU GOT MONEY TO BUY MORE.: NEVER TRUE

## 2022-05-23 SDOH — ECONOMIC STABILITY: FOOD INSECURITY: WITHIN THE PAST 12 MONTHS, THE FOOD YOU BOUGHT JUST DIDN'T LAST AND YOU DIDN'T HAVE MONEY TO GET MORE.: NEVER TRUE

## 2022-05-23 ASSESSMENT — PATIENT HEALTH QUESTIONNAIRE - PHQ9
2. FEELING DOWN, DEPRESSED OR HOPELESS: 0
DEPRESSION UNABLE TO ASSESS: FUNCTIONAL CAPACITY MOTIVATION LIMITS ACCURACY
1. LITTLE INTEREST OR PLEASURE IN DOING THINGS: 0
SUM OF ALL RESPONSES TO PHQ QUESTIONS 1-9: 0
SUM OF ALL RESPONSES TO PHQ9 QUESTIONS 1 & 2: 0

## 2022-05-23 ASSESSMENT — SOCIAL DETERMINANTS OF HEALTH (SDOH): HOW HARD IS IT FOR YOU TO PAY FOR THE VERY BASICS LIKE FOOD, HOUSING, MEDICAL CARE, AND HEATING?: NOT HARD AT ALL

## 2022-05-23 NOTE — PROGRESS NOTES
[facility]  Department of Family Medicine  Family Medicine Residency Program      Patient:  Christopher Ivyain 61 y.o. female  Date of Service: 5/23/22      Chief complaint:   Chief Complaint   Patient presents with    Thyroid Problem    Other     leg cramps, wake her up, very painfuk, can last over an hour          History ofPresent Illness   Christopher Perez is a 61 y.o. female who presents to the clinic with complaints as above. Rash on left forearm  For a few months, patch of itchy, sometimes burning dry skin, raised  Unsure of etiology, no other rashes anywhere, nothing like this in the past  Tried her grandson's cream on it, moisturizer, neither seem to help, doesn't think it was a steroid cream    Hypothyroid   Has been a while since it was checked last  Lost her meds for about a week while moving recently, but has been taking them since she found them  Fatigue, brittle nails, always cold, leg cramps    Leg Cramps  Bilateral, quads, calves, and feet/anterior ankles  For many years  Has supplements for cramping that help a little  Poor flexibility on exam today  Her nerves sometimes feel like they are jumping at night    Past Medical History:      Diagnosis Date    Hypothyroidism        Past Surgical History:        Procedure Laterality Date    COLONOSCOPY  01/20/2017    Dr. Bangura Fat, 10 years    FELY AND BSO  06/17/2015    Fibroids, Dr. Albaro Valdivia  01/20/2017     [facility] [facility]       Allergies:    Patient has no known allergies.     Social History:   Social History     Socioeconomic History    Marital status: Single     Spouse name: Not on file    Number of children: 2    Years of education: 15    Highest education level: Not on file   Occupational History    Occupation: customer service   Tobacco Use    Smoking status: Former Smoker     Packs/day: 0.50     Years: 2.00     Pack years: 1.00     Types: Cigarettes     Quit date: 6/10/1988     Years since quittin.9    Smokeless tobacco: Never Used   Substance and Sexual Activity    Alcohol use: No     Alcohol/week: 0.0 standard drinks    Drug use: No    Sexual activity: Not on file   Other Topics Concern    Not on file   Social History Narrative    Patient lives at home with family. She is independent with ADL's, and does not require the use of an assistive device. Social Determinants of Health     Financial Resource Strain: Low Risk     Difficulty of Paying Living Expenses: Not hard at all   Food Insecurity: No Food Insecurity    Worried About Running Out of Food in the Last Year: Never true    Brian of Food in the Last Year: Never true   Transportation Needs:     Lack of Transportation (Medical): Not on file    Lack of Transportation (Non-Medical):  Not on file   Physical Activity:     Days of Exercise per Week: Not on file    Minutes of Exercise per Session: Not on file   Stress:     Feeling of Stress : Not on file   Social Connections:     Frequency of Communication with Friends and Family: Not on file    Frequency of Social Gatherings with Friends and Family: Not on file    Attends Mandaen Services: Not on file    Active Member of [employer] or Organizations: Not on file    Attends Club or Organization Meetings: Not on file    Marital Status: Not on file   Intimate Partner Violence:     Fear of Current or Ex-Partner: Not on file    Emotionally Abused: Not on file    Physically Abused: Not on file    Sexually Abused: Not on file   Housing Stability:     Unable to Pay for Housing in the Last Year: Not on file    Number of [place] in the Last Year: Not on file    Unstable Housing in the Last Year: Not on file        Family History:       Problem Relation Age of Onset    Diabetes Mother     Arthritis Mother     High Blood Pressure Mother     Clotting Disorder Mother     Diabetes Father     Arthritis Father     High Blood Pressure Sister     No Known Problems Brother     Heart Disease Maternal Grandmother         Pacemaker    Stroke Maternal Grandmother     Heart Attack Maternal Grandfather     Heart Disease Maternal Grandfather     High Blood Pressure Sister     Clotting Disorder Sister        Medication List:    Current Outpatient Medications   Medication Sig Dispense Refill    famotidine (PEPCID) 20 MG tablet Take 1 tablet by mouth 2 times daily as needed (heartburn and pain) 60 tablet 5    triamcinolone (ARISTOCORT) 0.5 % cream Apply topically 3 times daily. 15 g 1    Multiple Vitamins-Minerals (THERAPEUTIC MULTIVITAMIN-MINERALS) tablet Take 1 tablet by mouth daily 30 tablet 11    levothyroxine (SYNTHROID) 125 MCG tablet Take one tablet by mouth daily. 30 tablet 0     No current facility-administered medications for this visit. Review of Systems:   Review of Systems as per HPI    Physical Exam   Vitals: BP (!) 134/90   Pulse 68   Temp 98.2 °F (36.8 °C)   Ht 5' 6\" (1.676 m)   Wt 253 lb (114.8 kg)   LMP 03/15/2015   SpO2 98%   BMI 40.84 kg/m²   Physical Exam  Vitals and nursing note reviewed. Constitutional:       General: She is not in acute distress. Appearance: Normal appearance. HENT:      Head: Normocephalic and atraumatic. Eyes:      General:         Right eye: No discharge. Left eye: No discharge. Cardiovascular:      Rate and Rhythm: Normal rate and regular rhythm. Heart sounds: No murmur heard. Pulmonary:      Effort: Pulmonary effort is normal.      Breath sounds: Normal breath sounds. No wheezing. Abdominal:      General: Bowel sounds are normal.      Palpations: Abdomen is soft. Tenderness: There is no abdominal tenderness. Musculoskeletal:      Cervical back: Normal range of motion. No rigidity. Right lower leg: No edema. Left lower leg: No edema. Comments: Poor flexibility in hip flexion, knee flexion, ankle dorsiflexion   Skin:     General: Skin is warm and dry. Findings: Rash (approx. 8cm by 5 cm patch of lichanified skin without scale or sheen, well defined, rough to touch, nonerythematous on left extensor forearm) present. Neurological:      Mental Status: She is alert and oriented to person, place, and time. Mental status is at baseline. Sensory: No sensory deficit. Motor: No weakness. Assessment and Plan     1. Acquired hypothyroidism  Last TSH 3/10/2021 3.84  Due for recheck  Symptoms of nail brittleness, weight gain  Follow-up 1 month or sooner if needed pending result  - TSH; Future  - levothyroxine (SYNTHROID) 125 MCG tablet; Take one tablet by mouth daily. Dispense: 30 tablet; Refill: 0    2. Acute eczema  Of the left forearm  Trial of steroid cream  Follow-up 1 month  - triamcinolone (ARISTOCORT) 0.5 % cream; Apply topically 3 times daily. Dispense: 15 g; Refill: 1    3. Leg cramps  Bilaterally, for many years  Work-up as below  Follow-up 1 month  - CBC with Auto Differential; Future  - Comprehensive Metabolic Panel; Future  - Multiple Vitamins-Minerals (THERAPEUTIC MULTIVITAMIN-MINERALS) tablet; Take 1 tablet by mouth daily  Dispense: 30 tablet; Refill: 11  - Iron and TIBC; Future  - Ferritin; Future    4. Gastroesophageal reflux disease without esophagitis  Med refill  - famotidine (PEPCID) 20 MG tablet; Take 1 tablet by mouth 2 times daily as needed (heartburn and pain)  Dispense: 60 tablet; Refill: 5    5. Encounter for hepatitis C screening test for low risk patient  Screening  - Hepatitis C Antibody; Future    6. Encounter for screening for HIV  Screening  - HIV Screen; Future      Counseled regarding above diagnosis, including possible risks and complications, especially if left uncontrolled. Counseled regarding the possible side effects, risks, benefits and alternatives to treatment; patient and/or guardian verbalizes understanding, agrees, feels comfortable with, and wishes to proceed with above treatment plan.     Call or go to

## 2022-05-23 NOTE — PROGRESS NOTES
S: 61 y.o. female with   Chief Complaint   Patient presents with    Thyroid Problem    Other     leg cramps, wake her up, very painfuk, can last over an hour      Hypothyroid  -f/u  -always cold, noticed some weight gain  -went a week without taking synthroid perviously    Rash  -new issue  -left forearm    Leg cramps  -happens at night  -feels some tingling in her legs     GERD   -needs med refill    O: VS:  height is 5' 6\" (1.676 m) and weight is 253 lb (114.8 kg). Her temperature is 98.2 °F (36.8 °C). Her blood pressure is 134/90 (abnormal) and her pulse is 68. Her oxygen saturation is 98%. BP Readings from Last 3 Encounters:   05/23/22 (!) 134/90   05/24/21 136/86   03/08/21 118/72     See resident note  Raised eczematous patch on left forearm     Impression/Plan:   1) Hypothyroid - repeat TSH  2) Eczema - steroid cream   3) Leg cramps - labs to further evaluate  4) GERD - med refill    4) HCM - hep c and hiv screen      Health Maintenance Due   Topic Date Due    COVID-19 Vaccine (1) Never done    HIV screen  Never done    Hepatitis C screen  Never done    DTaP/Tdap/Td vaccine (1 - Tdap) Never done    Shingles vaccine (1 of 2) Never done         Attending Physician Statement  I have discussed the case, including pertinent history and exam findings with the resident. I agree with the documented assessment and plan.       Song Avers, DO

## 2022-05-24 LAB
ALBUMIN SERPL-MCNC: 4.5 G/DL (ref 3.5–5.2)
ALP BLD-CCNC: 92 U/L (ref 35–104)
ALT SERPL-CCNC: 20 U/L (ref 0–32)
ANION GAP SERPL CALCULATED.3IONS-SCNC: 13 MMOL/L (ref 7–16)
AST SERPL-CCNC: 27 U/L (ref 0–31)
BILIRUB SERPL-MCNC: 0.6 MG/DL (ref 0–1.2)
BUN BLDV-MCNC: 14 MG/DL (ref 6–20)
CALCIUM SERPL-MCNC: 9 MG/DL (ref 8.6–10.2)
CHLORIDE BLD-SCNC: 105 MMOL/L (ref 98–107)
CO2: 22 MMOL/L (ref 22–29)
CREAT SERPL-MCNC: 0.8 MG/DL (ref 0.5–1)
FERRITIN: 189 NG/ML
GFR AFRICAN AMERICAN: >60
GFR NON-AFRICAN AMERICAN: >60 ML/MIN/1.73
GLUCOSE BLD-MCNC: 99 MG/DL (ref 74–99)
HEPATITIS C ANTIBODY INTERPRETATION: NORMAL
HIV-1 AND HIV-2 ANTIBODIES: NORMAL
IRON SATURATION: 22 % (ref 15–50)
IRON: 72 MCG/DL (ref 37–145)
POTASSIUM SERPL-SCNC: 4.5 MMOL/L (ref 3.5–5)
SODIUM BLD-SCNC: 140 MMOL/L (ref 132–146)
TOTAL IRON BINDING CAPACITY: 323 MCG/DL (ref 250–450)
TOTAL PROTEIN: 7.6 G/DL (ref 6.4–8.3)
TSH SERPL DL<=0.05 MIU/L-ACNC: 6.41 UIU/ML (ref 0.27–4.2)

## 2022-05-25 DIAGNOSIS — E03.9 ACQUIRED HYPOTHYROIDISM: ICD-10-CM

## 2022-05-25 RX ORDER — LEVOTHYROXINE SODIUM 137 UG/1
TABLET ORAL
Qty: 30 TABLET | Refills: 1 | Status: SHIPPED | OUTPATIENT
Start: 2022-05-25 | End: 2022-05-31 | Stop reason: SDUPTHER

## 2022-05-31 DIAGNOSIS — E03.9 ACQUIRED HYPOTHYROIDISM: ICD-10-CM

## 2022-05-31 RX ORDER — LEVOTHYROXINE SODIUM 137 UG/1
TABLET ORAL
Qty: 90 TABLET | Refills: 1 | Status: SHIPPED
Start: 2022-05-31 | End: 2022-08-17

## 2022-05-31 NOTE — TELEPHONE ENCOUNTER
Requested Prescriptions     Pending Prescriptions Disp Refills    levothyroxine (SYNTHROID) 137 MCG tablet 90 tablet 1     Sig: Take one tablet by mouth daily.

## 2022-06-20 ENCOUNTER — OFFICE VISIT (OUTPATIENT)
Dept: FAMILY MEDICINE CLINIC | Age: 57
End: 2022-06-20
Payer: COMMERCIAL

## 2022-06-20 VITALS
DIASTOLIC BLOOD PRESSURE: 89 MMHG | SYSTOLIC BLOOD PRESSURE: 137 MMHG | OXYGEN SATURATION: 98 % | WEIGHT: 254 LBS | BODY MASS INDEX: 42.32 KG/M2 | TEMPERATURE: 97.3 F | HEIGHT: 65 IN | HEART RATE: 77 BPM

## 2022-06-20 DIAGNOSIS — R53.83 FATIGUE, UNSPECIFIED TYPE: ICD-10-CM

## 2022-06-20 DIAGNOSIS — G47.00 INSOMNIA, UNSPECIFIED TYPE: ICD-10-CM

## 2022-06-20 DIAGNOSIS — L30.9 ACUTE ECZEMA: ICD-10-CM

## 2022-06-20 DIAGNOSIS — E03.9 ACQUIRED HYPOTHYROIDISM: Primary | ICD-10-CM

## 2022-06-20 PROCEDURE — 99214 OFFICE O/P EST MOD 30 MIN: CPT | Performed by: FAMILY MEDICINE

## 2022-06-20 NOTE — PROGRESS NOTES
[facility]  Precepting Note    Subjective:  Fatigue, Hypothyroidism  Dose of Synthroid was increased recently  Sleep issues  Snoring, frequent awakening    ROS otherwise negative     Past medical, surgical, family and social history were reviewed, non-contributory, and unchanged unless otherwise stated. Objective:    /89   Pulse 77   Temp 97.3 °F (36.3 °C) (Temporal)   Ht 5' 5\" (1.651 m)   Wt 254 lb (115.2 kg)   LMP 03/15/2015   SpO2 98%   BMI 42.27 kg/m²     Exam is as noted by resident with the following changes, additions or corrections:    General:  NAD; alert & oriented x 3   Heart:  RRR, no murmurs, gallops, or rubs. Lungs:  CTA bilaterally, no wheeze, rales or rhonchi  Abd: bowel sounds present, nontender, nondistended, no masses  Extrem:  No clubbing, cyanosis, or edema    Assessment/Plan:  Hypothyroidism : check TFT , continue the same  Fatigue  Sleep disorder- possible sleep apnea: declined sleep study     Attending Physician Statement  I have reviewed the chart, including any radiology or labs. I have discussed the case, including pertinent history and exam findings with the resident. I agree with the assessment, plan and orders as documented by the resident. Please refer to the resident note for additional information.       Electronically signed by Margie Feliciano MD on 6/20/2022 at 2:21 PM

## 2022-06-20 NOTE — PATIENT INSTRUCTIONS
Patient Education         Sleeping Better (02:11)  Your health professional recommends that you watch this short online [employer]. Learn tips for getting a good night's sleep. Purpose:  Gives tips for better sleep, including getting exercise, having a set bedtime,and avoiding caffeine. Goal:  User will learn tips for getting better sleep, including being active duringthe day, having bedtime routines, and avoiding caffeine. How to watch the video    Scan the QR code   OR Visit the website    https://hwi. se/[email or web address]   Current as of: June 16, 2021               Content Version: 13.2  © 2006-2022 [employer]. Care instructions adapted under license by [facility] ([facility]). If you have questions about a medical condition or this instruction, always ask your healthcare professional. [address] any warranty or liability for your use of this information. Patient Education        Learning About Sleeping Well  What does sleeping well mean? Sleeping well means getting enough sleep to feel good and stay healthy. Howmuch sleep is enough varies among people. The number of hours you sleep and how you feel when you wake up are both important. If you do not feel refreshed, you probably need more sleep. Anothersign of not getting enough sleep is feeling tired during the day. Experts recommend that adults get at least 7 or more hours of sleep per day. Children and older adults need more sleep. Why is getting enough sleep important? Getting enough quality sleep is a basic part of good health. When your sleep suffers, your physical health, mood, and your thoughts can suffer too. You may find yourself feeling more grumpy or stressed. Not getting enough sleep also can lead to serious problems, including injury, accidents, anxiety, anddepression. What might cause poor sleeping? Many things can cause sleep problems, including:   Changes to your sleep schedule.  Stress. Stress can be caused by fear about a single event, such as giving a speech. Or you may have ongoing stress, such as worry about work or school.  Depression, anxiety, and other mental or emotional conditions.  Changes in your sleep habits or surroundings. This includes changes that happen where you sleep, such as noise, light, or sleeping in a different bed. It also includes changes in your sleep pattern, such as having jet lag or working a late shift.  Health problems, such as pain, breathing problems, and restless legs syndrome.  Lack of regular exercise.  Using alcohol, nicotine, or caffeine before bed. How can you help yourself? Here are some tips that may help you sleep more soundly and wake up feelingmore refreshed. Your sleeping area    Use your bedroom only for sleeping and sex. A bit of light reading may help you fall asleep. But if it doesn't, do your reading elsewhere in the house. Try not to use your TV, computer, smartphone, or tablet while you are in bed.  Be sure your bed is big enough to stretch out comfortably, especially if you have a sleep partner.  Keep your bedroom quiet, dark, and cool. Use curtains, blinds, or a sleep mask to block out light. To block out noise, use earplugs, soothing music, or a \"white noise\" machine. Your evening and bedtime routine    Create a relaxing bedtime routine. You might want to take a warm shower or bath, or listen to soothing music.  Go to bed at the same time every night. And get up at the same time every morning, even if you feel tired. What to avoid    Limit caffeine (coffee, tea, caffeinated sodas) during the day, and don't have any for at least 6 hours before bedtime.  Avoid drinking alcohol before bedtime. Alcohol can cause you to wake up more often during the night.  Try not to smoke or use tobacco, especially in the evening. Nicotine can keep you awake.  Limit naps during the day, especially close to bedtime.    Avoid lying in bed awake for too long. If you can't fall asleep or if you wake up in the middle of the night and can't get back to sleep within about 20 minutes, get out of bed and go to another room until you feel sleepy.  Avoid taking medicine right before bed that may keep you awake or make you feel hyper or energized. Your doctor can tell you if your medicine may do this and if you can take it earlier in the day. If you can't sleep    Imagine yourself in a peaceful, pleasant scene. Focus on the details and feelings of being in a place that is relaxing.  Get up and do a quiet or boring activity until you feel sleepy.  Avoid drinking any liquids before going to bed to help prevent waking up often to use the bathroom. Where can you learn more? Go to https://Spring.mepeRupture.Relay Network. org and sign in to your Spotivate account. Enter H382 in the [place] box to learn more about \"Learning About Sleeping Well. \"     If you do not have an account, please click on the \"Sign Up Now\" link. Current as of: June 16, 2021               Content Version: 13.2  © 9884-0965 [employer]. Care instructions adapted under license by [facility] ([facility]). If you have questions about a medical condition or this instruction, always ask your healthcare professional. [address]  any warranty or liability for your use of this information.

## 2022-06-20 NOTE — PROGRESS NOTES
[facility]  Department of Family Medicine  Family Medicine Residency Program      Patient:  Wylie Dancer 61 y.o. female  Date of Service: 6/20/22      Chief complaint:   Chief Complaint   Patient presents with    Rash     recheck    Fatigue         History ofPresent Illness   Wylie Dancer is a 61 y.o. female who presents to the clinic with complaints as above. Hypothyroid and Fatigue  On increased dose of synthroid 137mcg since 5/25/22 for elevated TSH  Dozing off at work  Started intermittent a long time ago but much worse since the last visit on 5/23/22  Poor sleeping    [address] off through the day  Not too hard to fall asleep  Difficulty staying asleep, wakes up every 1-2 hours  Sometimes needs to urinate, uses the bathroom 1-2 times per night, mostly 1  Light sleeper  Does snore, elevated Blood Pressure, BMI 42, likely SHERIN but declines sleep study at this time despite education on importance of diagnosis  Would like to try melatonin    Eczema on left forearm, extensor surface  Slightly improved with steroid cream  Continues, itchy and occasionally burns  Trial of stronger steroid for now    Past Medical History:      Diagnosis Date    Hypothyroidism        Past Surgical History:        Procedure Laterality Date    COLONOSCOPY  01/20/2017    Dr. Lonny Kapoor, 10 years    TONSILLECTOMY      TOTAL ABDOMINAL HYSTERECTOMY W/ BILATERAL SALPINGOOPHORECTOMY  06/17/2015    Fibroids, Dr. Hernandez Pop  01/20/2017    Dr. Lonny Kapoor       Allergies:    Patient has no known allergies.     Social History:   Social History     Socioeconomic History    Marital status: Single     Spouse name: Not on file    Number of children: 2    Years of education: 15    Highest education level: Not on file   Occupational History    Occupation: customer service   Tobacco Use    Smoking status: Former Smoker     Packs/day: 0.50     Years: 2.00     Pack years: 1.00 Types: Cigarettes     Quit date: 6/10/1988     Years since quittin.0    Smokeless tobacco: Never Used   Substance and Sexual Activity    Alcohol use: No     Alcohol/week: 0.0 standard drinks    Drug use: No    Sexual activity: Not on file   Other Topics Concern    Not on file   Social History Narrative    Patient lives at home with family. She is independent with ADL's, and does not require the use of an assistive device. Social Determinants of Health     Financial Resource Strain: Low Risk     Difficulty of Paying Living Expenses: Not hard at all   Food Insecurity: No Food Insecurity    Worried About Running Out of Food in the Last Year: Never true    Brian of Food in the Last Year: Never true   Transportation Needs:     Lack of Transportation (Medical): Not on file    Lack of Transportation (Non-Medical):  Not on file   Physical Activity:     Days of Exercise per Week: Not on file    Minutes of Exercise per Session: Not on file   Stress:     Feeling of Stress : Not on file   Social Connections:     Frequency of Communication with Friends and Family: Not on file    Frequency of Social Gatherings with Friends and Family: Not on file    Attends Religion Services: Not on file    Active Member of [address] [place] [address] [employer] or Organizations: Not on file    Attends Club or Organization Meetings: Not on file    Marital Status: Not on file   Intimate Partner Violence:     Fear of Current or Ex-Partner: Not on file    Emotionally Abused: Not on file    Physically Abused: Not on file    Sexually Abused: Not on file   Housing Stability:     Unable to Pay for Housing in the Last Year: Not on file    Number of [place] in the Last Year: Not on file    Unstable Housing in the Last Year: Not on file        Family History:       Problem Relation Age of Onset    Diabetes Mother     Arthritis Mother     High Blood Pressure Mother     Clotting Disorder Mother     Diabetes Father     Arthritis Father  High Blood Pressure Sister     No Known Problems Brother     Heart Disease Maternal Grandmother         Pacemaker    Stroke Maternal Grandmother     Heart Attack Maternal Grandfather     Heart Disease Maternal Grandfather     High Blood Pressure Sister     Clotting Disorder Sister        Medication List:    Current Outpatient Medications   Medication Sig Dispense Refill    melatonin (RA MELATONIN) 3 MG TABS tablet Take 1 tablet by mouth at bedtime 30 tablet 2    triamcinolone (ARISTOCORT) 0.5 % ointment Apply topically 2 times daily. 15 g 1    levothyroxine (SYNTHROID) 137 MCG tablet Take one tablet by mouth daily. 90 tablet 1    famotidine (PEPCID) 20 MG tablet Take 1 tablet by mouth 2 times daily as needed (heartburn and pain) 60 tablet 5    Multiple Vitamins-Minerals (THERAPEUTIC MULTIVITAMIN-MINERALS) tablet Take 1 tablet by mouth daily 30 tablet 11     No current facility-administered medications for this visit. Review of Systems:   Review of Systems as per HPI    Physical Exam   Vitals: /89   Pulse 77   Temp 97.3 °F (36.3 °C) (Temporal)   Ht 5' 5\" (1.651 m)   Wt 254 lb (115.2 kg)   LMP 03/15/2015   SpO2 98%   BMI 42.27 kg/m²   Physical Exam  Vitals and nursing note reviewed. Constitutional:       General: She is not in acute distress. Appearance: Normal appearance. She is obese. HENT:      Head: Normocephalic and atraumatic. Eyes:      General:         Right eye: No discharge. Left eye: No discharge. Cardiovascular:      Rate and Rhythm: Normal rate and regular rhythm. Heart sounds: No murmur heard. Pulmonary:      Effort: Pulmonary effort is normal.      Breath sounds: Normal breath sounds. No wheezing. Abdominal:      General: Bowel sounds are normal.      Palpations: Abdomen is soft. Musculoskeletal:         General: Normal range of motion. Cervical back: Normal range of motion. No rigidity. Right lower leg: No edema.       Left lower leg: No edema. Skin:     General: Skin is warm and dry. Findings: Rash (left extensor forearm with large (improved) patch of lichenified skin) present. Neurological:      Mental Status: She is alert and oriented to person, place, and time. Mental status is at baseline. Assessment and Plan     1. Acquired hypothyroidism  On Synthroid 137mcg  Recheck TSH in 3 weeks  FU 4 weeks  - TSH; Future    2. Fatigue, unspecified type  For about a month  Needs a few more weeks to adjust to new dose of synthroid  Likely SHERIN, declines sleep study  Trail melatonin  FU 1 month  - melatonin (RA MELATONIN) 3 MG TABS tablet; Take 1 tablet by mouth at bedtime  Dispense: 30 tablet; Refill: 2    3. Insomnia, unspecified type  As above  - melatonin (RA MELATONIN) 3 MG TABS tablet; Take 1 tablet by mouth at bedtime  Dispense: 30 tablet; Refill: 2    4. Acute eczema  Slightly improved  Trial of stronger steroid  FU 1 month  - triamcinolone (ARISTOCORT) 0.5 % ointment; Apply topically 2 times daily. Dispense: 15 g; Refill: 1      Counseled regarding above diagnosis, including possible risks and complications, especially if left uncontrolled. Counseled regarding the possible side effects, risks, benefits and alternatives to treatment; patient and/or guardian verbalizes understanding, agrees, feels comfortable with, and wishes to proceed with above treatment plan. Call or go to ED immediately if symptoms worsen or persist. Advised patient to call with any new medication issues and, as applicable, read all Rx info from pharmacy to assure aware of all possible risks and side effects of medication before taking. Patient and/or guardian given opportunity to ask questions/raise concerns. The patient verbalized comfort and understanding of instructions. I encourage further reading and education about your health conditions.   Information on many health conditions is provided by the American Academy of Family [employer]icians: https://familydoctor. org/  Please bring any questions to me at your next visit. Return to Office: Return in about 4 weeks (around 7/18/2022) for FU thyroid, fatigue.     Angelia Bansal, DO

## 2022-06-21 PROBLEM — R53.83 FATIGUE: Status: ACTIVE | Noted: 2022-06-21

## 2022-06-21 RX ORDER — TRIAMCINOLONE ACETONIDE 5 MG/G
OINTMENT TOPICAL
Qty: 15 G | Refills: 1 | Status: SHIPPED | OUTPATIENT
Start: 2022-06-21 | End: 2022-06-27

## 2022-06-21 RX ORDER — LANOLIN ALCOHOL/MO/W.PET/CERES
3 CREAM (GRAM) TOPICAL NIGHTLY
Qty: 30 TABLET | Refills: 2 | Status: SHIPPED | OUTPATIENT
Start: 2022-06-21

## 2022-08-17 DIAGNOSIS — E03.9 ACQUIRED HYPOTHYROIDISM: ICD-10-CM

## 2022-08-17 RX ORDER — LEVOTHYROXINE SODIUM 137 UG/1
TABLET ORAL
Qty: 30 TABLET | Refills: 1 | Status: SHIPPED
Start: 2022-08-17 | End: 2022-08-29 | Stop reason: SDUPTHER

## 2022-08-29 ENCOUNTER — OFFICE VISIT (OUTPATIENT)
Dept: FAMILY MEDICINE CLINIC | Age: 57
End: 2022-08-29
Payer: COMMERCIAL

## 2022-08-29 VITALS
HEART RATE: 72 BPM | HEIGHT: 65 IN | OXYGEN SATURATION: 97 % | BODY MASS INDEX: 41.65 KG/M2 | DIASTOLIC BLOOD PRESSURE: 68 MMHG | WEIGHT: 250 LBS | SYSTOLIC BLOOD PRESSURE: 102 MMHG | RESPIRATION RATE: 18 BRPM

## 2022-08-29 DIAGNOSIS — E03.9 ACQUIRED HYPOTHYROIDISM: ICD-10-CM

## 2022-08-29 DIAGNOSIS — K21.9 GASTROESOPHAGEAL REFLUX DISEASE WITHOUT ESOPHAGITIS: ICD-10-CM

## 2022-08-29 DIAGNOSIS — G47.00 INSOMNIA, UNSPECIFIED TYPE: Primary | ICD-10-CM

## 2022-08-29 LAB — TSH SERPL DL<=0.05 MIU/L-ACNC: 10.5 UIU/ML (ref 0.27–4.2)

## 2022-08-29 PROCEDURE — 99213 OFFICE O/P EST LOW 20 MIN: CPT | Performed by: FAMILY MEDICINE

## 2022-08-29 RX ORDER — FAMOTIDINE 20 MG/1
20 TABLET, FILM COATED ORAL 2 TIMES DAILY PRN
Qty: 60 TABLET | Refills: 5 | Status: SHIPPED | OUTPATIENT
Start: 2022-08-29

## 2022-08-29 RX ORDER — LEVOTHYROXINE SODIUM 137 UG/1
TABLET ORAL
Qty: 30 TABLET | Refills: 1 | Status: SHIPPED
Start: 2022-08-29 | End: 2022-08-31

## 2022-08-29 ASSESSMENT — ENCOUNTER SYMPTOMS
DIARRHEA: 0
ABDOMINAL PAIN: 0
TROUBLE SWALLOWING: 0
COUGH: 0
NAUSEA: 0
SORE THROAT: 0
CONSTIPATION: 0
VOMITING: 0

## 2022-08-29 NOTE — PROGRESS NOTES
S: 61 y.o. female with   Chief Complaint   Patient presents with    Follow-up       Hypothyroidism  -f/u  -adjusted dose of synthroid during last visit  -needs repeat lab work    Insomnia  -f/u  -recommended sleep study, did not want to do this  -adjusting diet and this is helping     O: VS:  height is 5' 5\" (1.651 m) and weight is 250 lb (113.4 kg). Her blood pressure is 102/68 and her pulse is 72. Her respiration is 18 and oxygen saturation is 97%. BP Readings from Last 3 Encounters:   08/29/22 102/68   06/20/22 137/89   05/23/22 (!) 134/90     See resident note    Impression/Plan:   1) Hypothyroidism - needs repeat TSH  2) Insomnia - improving, continue same      Health Maintenance Due   Topic Date Due    COVID-19 Vaccine (1) Never done    DTaP/Tdap/Td vaccine (1 - Tdap) Never done    Shingles vaccine (1 of 2) Never done         Attending Physician Statement  I have discussed the case, including pertinent history and exam findings with the resident. I agree with the documented assessment and plan.       Ivanna Layne, DO

## 2022-08-29 NOTE — PROGRESS NOTES
Grandfather     High Blood Pressure Sister     Clotting Disorder Sister        Social History:  Social History     Tobacco Use    Smoking status: Former     Packs/day: 0.50     Years: 2.00     Pack years: 1.00     Types: Cigarettes     Quit date: 6/10/1988     Years since quittin.2    Smokeless tobacco: Never   Substance Use Topics    Alcohol use: No     Alcohol/week: 0.0 standard drinks         There is no immunization history on file for this patient. Review of Systems   Constitutional:  Negative for chills and fever. HENT:  Negative for congestion, sore throat and trouble swallowing. Respiratory:  Negative for cough. Cardiovascular:  Negative for chest pain and leg swelling. Gastrointestinal:  Negative for abdominal pain, constipation, diarrhea, nausea and vomiting. Genitourinary:  Negative for difficulty urinating. Musculoskeletal:  Negative for arthralgias and myalgias. Skin:  Negative for rash and wound. Neurological:  Negative for dizziness and headaches. Psychiatric/Behavioral:  Negative for agitation. VS:  /68   Pulse 72   Resp 18   Ht 5' 5\" (1.651 m)   Wt 250 lb (113.4 kg)   LMP 03/15/2015   SpO2 97%   BMI 41.60 kg/m²     Physical Exam  Constitutional:       Appearance: Normal appearance. HENT:      Head: Normocephalic. Nose: Nose normal. No rhinorrhea. Eyes:      General: No scleral icterus. Conjunctiva/sclera: Conjunctivae normal.   Cardiovascular:      Rate and Rhythm: Normal rate and regular rhythm. Pulses: Normal pulses. Heart sounds: Normal heart sounds. No murmur heard. Pulmonary:      Breath sounds: Normal breath sounds. No wheezing, rhonchi or rales. Abdominal:      General: Abdomen is flat. Bowel sounds are normal.   Musculoskeletal:      Right lower leg: No edema. Left lower leg: No edema. Skin:     General: Skin is warm. Findings: No rash. Neurological:      General: No focal deficit present.       Mental Status: She is alert. Assessment/Plan:    Acquired hypothyroidism  TSH today in clinic  - levothyroxine (SYNTHROID) 137 MCG tablet; TAKE ONE TABLET BY MOUTH DAILY  Dispense: 30 tablet; Refill: 1    Insomnia  Pt stated her sleep is better   Will hold off melatonin as pt does not want to   Still refusing sleep study, discussed importance pf study to r/o SHERIN    Gastroesophageal reflux disease without esophagitis  Refill   - famotidine (PEPCID) 20 MG tablet; Take 1 tablet by mouth 2 times daily as needed (heartburn and pain)  Dispense: 60 tablet; Refill: 5      Follow up:  as scheduled with PCP    Patient agrees with the above stated plan. Janett Anderson received counseling on the following healthy behaviors: nutrition, exercise, and medication adherence.     Damon Luciano MD  PGY-3 Family Medicine

## 2022-08-31 RX ORDER — LEVOTHYROXINE SODIUM 0.15 MG/1
150 TABLET ORAL DAILY
Qty: 30 TABLET | Refills: 2 | Status: SHIPPED | OUTPATIENT
Start: 2022-08-31

## 2022-08-31 NOTE — PROGRESS NOTES
TSH elevated. Increase synthroid to 150mcg per day. Please notify patient, keep 11/14 appt with Dr. Maris Franklin, will need to recheck TSH in about 2 months.

## 2023-01-17 ENCOUNTER — TELEPHONE (OUTPATIENT)
Dept: FAMILY MEDICINE CLINIC | Age: 58
End: 2023-01-17

## 2023-01-17 DIAGNOSIS — E03.9 ACQUIRED HYPOTHYROIDISM: ICD-10-CM

## 2023-01-17 RX ORDER — LEVOTHYROXINE SODIUM 0.15 MG/1
150 TABLET ORAL DAILY
Qty: 30 TABLET | Refills: 0 | Status: SHIPPED | OUTPATIENT
Start: 2023-01-17

## 2023-02-24 ENCOUNTER — OFFICE VISIT (OUTPATIENT)
Dept: FAMILY MEDICINE CLINIC | Age: 58
End: 2023-02-24
Payer: COMMERCIAL

## 2023-02-24 VITALS
SYSTOLIC BLOOD PRESSURE: 131 MMHG | TEMPERATURE: 97.8 F | HEART RATE: 78 BPM | OXYGEN SATURATION: 98 % | DIASTOLIC BLOOD PRESSURE: 81 MMHG | HEIGHT: 65 IN | BODY MASS INDEX: 42.82 KG/M2 | WEIGHT: 257 LBS | RESPIRATION RATE: 18 BRPM

## 2023-02-24 DIAGNOSIS — K21.9 GASTROESOPHAGEAL REFLUX DISEASE WITHOUT ESOPHAGITIS: ICD-10-CM

## 2023-02-24 DIAGNOSIS — Z13.1 SCREENING FOR DIABETES MELLITUS: ICD-10-CM

## 2023-02-24 DIAGNOSIS — H81.12 BPPV (BENIGN PAROXYSMAL POSITIONAL VERTIGO), LEFT: ICD-10-CM

## 2023-02-24 DIAGNOSIS — E03.9 ACQUIRED HYPOTHYROIDISM: ICD-10-CM

## 2023-02-24 DIAGNOSIS — E03.9 ACQUIRED HYPOTHYROIDISM: Primary | ICD-10-CM

## 2023-02-24 LAB
MAMMOGRAPHY, EXTERNAL: NEGATIVE
TSH SERPL DL<=0.05 MIU/L-ACNC: 0.15 UIU/ML (ref 0.27–4.2)

## 2023-02-24 PROCEDURE — 99214 OFFICE O/P EST MOD 30 MIN: CPT

## 2023-02-24 RX ORDER — LEVOTHYROXINE SODIUM 0.15 MG/1
150 TABLET ORAL DAILY
Qty: 30 TABLET | Refills: 0 | Status: SHIPPED | OUTPATIENT
Start: 2023-02-24

## 2023-02-24 RX ORDER — FAMOTIDINE 20 MG/1
20 TABLET, FILM COATED ORAL 2 TIMES DAILY PRN
Qty: 60 TABLET | Refills: 5 | Status: SHIPPED | OUTPATIENT
Start: 2023-02-24

## 2023-02-24 SDOH — ECONOMIC STABILITY: INCOME INSECURITY: HOW HARD IS IT FOR YOU TO PAY FOR THE VERY BASICS LIKE FOOD, HOUSING, MEDICAL CARE, AND HEATING?: NOT HARD AT ALL

## 2023-02-24 SDOH — ECONOMIC STABILITY: FOOD INSECURITY: WITHIN THE PAST 12 MONTHS, YOU WORRIED THAT YOUR FOOD WOULD RUN OUT BEFORE YOU GOT MONEY TO BUY MORE.: NEVER TRUE

## 2023-02-24 SDOH — ECONOMIC STABILITY: HOUSING INSECURITY
IN THE LAST 12 MONTHS, WAS THERE A TIME WHEN YOU DID NOT HAVE A STEADY PLACE TO SLEEP OR SLEPT IN A SHELTER (INCLUDING NOW)?: NO

## 2023-02-24 SDOH — ECONOMIC STABILITY: FOOD INSECURITY: WITHIN THE PAST 12 MONTHS, THE FOOD YOU BOUGHT JUST DIDN'T LAST AND YOU DIDN'T HAVE MONEY TO GET MORE.: NEVER TRUE

## 2023-02-24 ASSESSMENT — PATIENT HEALTH QUESTIONNAIRE - PHQ9
SUM OF ALL RESPONSES TO PHQ QUESTIONS 1-9: 0
1. LITTLE INTEREST OR PLEASURE IN DOING THINGS: 0
SUM OF ALL RESPONSES TO PHQ QUESTIONS 1-9: 0
SUM OF ALL RESPONSES TO PHQ9 QUESTIONS 1 & 2: 0
2. FEELING DOWN, DEPRESSED OR HOPELESS: 0

## 2023-02-24 ASSESSMENT — ENCOUNTER SYMPTOMS
EYE PAIN: 0
DIARRHEA: 0
NAUSEA: 0
RHINORRHEA: 0
WHEEZING: 0
SHORTNESS OF BREATH: 0
SORE THROAT: 0

## 2023-02-24 NOTE — PROGRESS NOTES
Subjective:  Melanie Alberto is a 61 y.o. female with chief complaint of Hypothyroidism (Pt states she gets dizzy on and off. )      HPI:  Dizziness   Like the room is spinning  Onset - weeks   Duration - seconds  Frequency - intermittent  Worsened - position  Better - unknown  Associated - achy arm,   Has not has any other neurological complaints  Last sinus infection back in December    Hypothyroidism  For twenty years  On levo 150 mcg  Needs new blood work      ROS:  Review of Systems   Constitutional:  Positive for fatigue. Negative for chills and fever. HENT:  Negative for congestion, rhinorrhea and sore throat. Eyes:  Negative for pain and visual disturbance. Respiratory:  Negative for shortness of breath and wheezing. Cardiovascular:  Negative for chest pain and palpitations. Gastrointestinal:  Negative for diarrhea and nausea. Genitourinary:  Negative for dysuria and hematuria. Musculoskeletal:  Negative for arthralgias and myalgias. Skin:  Negative for rash. Neurological:  Positive for dizziness. Negative for headaches. Objective:  Vitals:    02/24/23 1430   BP: 131/81   Pulse: 78   Resp: 18   Temp: 97.8 °F (36.6 °C)   SpO2: 98%   Weight: 257 lb (116.6 kg)   Height: 5' 5\" (1.651 m)     Physical Exam  Vitals and nursing note reviewed. Constitutional:       General: She is not in acute distress. Appearance: Normal appearance. HENT:      Head: Normocephalic and atraumatic. Left Ear: There is impacted cerumen. Eyes:      General:         Right eye: No discharge. Left eye: No discharge. Cardiovascular:      Rate and Rhythm: Normal rate and regular rhythm. Heart sounds: No murmur heard. Pulmonary:      Effort: Pulmonary effort is normal.      Breath sounds: No wheezing. Abdominal:      General: Bowel sounds are normal.      Palpations: Abdomen is soft. Tenderness: There is no abdominal tenderness. Musculoskeletal:         General: No swelling.  Normal range of motion. Right lower leg: No edema. Left lower leg: No edema. Skin:     General: Skin is warm and dry. Neurological:      General: No focal deficit present. Mental Status: She is oriented to person, place, and time. Comments: Positive Vandervoort-Hallpike left side       Assessment/Plan:   Diagnosis Orders   1. Acquired hypothyroidism  TSH    levothyroxine (SYNTHROID) 150 MCG tablet     - Patient has hypothyroidism for 20 years  - issue with med compliance, sometimes forgets to take med  - Last TSH 10.5  - Will repeat lab work, adjust levothyroxine accordingly with results  - Education on taking medication  - will refill meds at this time     2. Gastroesophageal reflux disease without esophagitis  famotidine (PEPCID) 20 MG tablet     - Refilled pepcid 20 mg     3. Screening for diabetes mellitus  Hemoglobin A1C     - complained of fatigue  - last a1c [3/21] 5.1  - repeat A1C     4. BPPV (benign paroxysmal positional vertigo), left       - Positive Vandervoort-hallpike on left side  - educated on Epley maneuver   - debrox for ear wax  - RTC in 6 weeks for re-evaluation       Counseled regarding above diagnosis, including possible risks and complications,  especially if left uncontrolled. Counseled regarding the possible side effects, risks, benefits and alternatives to treatment;patient and/or guardian verbalizes understanding, agrees, feels comfortable with and wishes to proceed with above treatment plan. Call or go to ED immediately if symptoms worsen or persist. Advised patient to call with any new medication issues, and, as applicable, read all Rx info from pharmacy to assure aware of all possible risks and side effects of medicationbefore taking. Patient and/or guardian given opportunity to ask questions/raise concerns. The patient verbalized comfort and understanding ofinstructions. Reviewed age and gender appropriate health screening exams and vaccinations.   Advised patient regarding importance of keeping up with recommended health maintenance and to schedule as soon as possible if overdue, as this is important in assessing for undiagnosed pathology, especially cancer, as well as protecting against potentially harmful/life threatening disease. I encourage further reading and education about your health conditions . Information on many healthconditions is provided by the American Academy of Family Physicians: https://familydoctor. org/  Please bring any questions to me at your next visit. This document may have been prepared at least partially through the use of voice recognition software. Although effort is taken to assure the accuracy of this document, it is possible that grammatical, syntax,  or spelling errors may occur. Return in about 6 weeks (around 4/7/2023).     Electronically signed by Mikael Barker MD on 2/24/23 at 2:37 PM EST

## 2023-02-24 NOTE — PROGRESS NOTES
[facility]    Subjective:  60 yo F here for f/u hypothyroidism, recent dose adjustment  Also c/o dizziness/ vertigo  Currently on levothyroxine 150 mcg daily. Lab Results   Component Value Date    TSH 10.500 (H) 08/29/2022     Short episodes of dzziness  Worse with change in position  +laurence hallpike  ROS otherwise as per resident note     Past medical, surgical, family and social history were reviewed, non-contributory, and unchanged unless otherwise stated. Objective:    /81   Pulse 78   Temp 97.8 °F (36.6 °C)   Resp 18   Ht 5' 5\" (1.651 m)   Wt 257 lb (116.6 kg)   LMP 03/15/2015   SpO2 98%   BMI 42.77 kg/m²     Exam is as noted by resident     Assessment/Plan:  BPPV - left side- modified epley maneuever  Hypothyrodism - repeat TSH; discuss proper method for taking levothryoxine ;adjust prn     Attending Physician Statement  I have reviewed the chart, including any radiology or labs. I have discussed the case, including pertinent history and exam findings with the resident. I agree with the assessment, plan and orders as documented by the resident. Please refer to the resident note for additional information.       Electronically signed by Eagle Nolasco MD on 2/24/2023 at 2:50 PM

## 2023-02-25 LAB — HBA1C MFR BLD: 5.2 % (ref 4–5.6)

## 2023-04-02 DIAGNOSIS — E03.9 ACQUIRED HYPOTHYROIDISM: ICD-10-CM

## 2023-04-03 RX ORDER — LEVOTHYROXINE SODIUM 0.15 MG/1
TABLET ORAL
Qty: 30 TABLET | Refills: 0 | Status: SHIPPED | OUTPATIENT
Start: 2023-04-03

## 2023-05-30 DIAGNOSIS — K21.9 GASTROESOPHAGEAL REFLUX DISEASE WITHOUT ESOPHAGITIS: ICD-10-CM

## 2023-05-30 DIAGNOSIS — E03.9 ACQUIRED HYPOTHYROIDISM: ICD-10-CM

## 2023-05-30 RX ORDER — FAMOTIDINE 20 MG/1
20 TABLET, FILM COATED ORAL 2 TIMES DAILY PRN
Qty: 60 TABLET | Refills: 1 | Status: SHIPPED | OUTPATIENT
Start: 2023-05-30

## 2023-05-30 RX ORDER — LEVOTHYROXINE SODIUM 0.15 MG/1
150 TABLET ORAL DAILY
Qty: 30 TABLET | Refills: 1 | Status: SHIPPED | OUTPATIENT
Start: 2023-05-30

## 2023-05-30 NOTE — TELEPHONE ENCOUNTER
Last Appointment   2/24/2023  Next Appointment  Visit date not found    Levothyroxine 150mcg  Famotidine 20mg send To [facility] in [place]

## 2023-08-29 DIAGNOSIS — E03.9 ACQUIRED HYPOTHYROIDISM: ICD-10-CM

## 2023-08-29 RX ORDER — LEVOTHYROXINE SODIUM 0.15 MG/1
TABLET ORAL
Qty: 30 TABLET | Refills: 0 | OUTPATIENT
Start: 2023-08-29

## 2023-08-31 DIAGNOSIS — E03.9 ACQUIRED HYPOTHYROIDISM: ICD-10-CM

## 2023-08-31 RX ORDER — LEVOTHYROXINE SODIUM 0.15 MG/1
150 TABLET ORAL DAILY
Qty: 30 TABLET | Refills: 0 | Status: SHIPPED | OUTPATIENT
Start: 2023-08-31

## 2023-08-31 NOTE — TELEPHONE ENCOUNTER
Last Appointment   2/24/2023  Next Appointment  9/15/23  Levothyroxine 150 mcg [facility] in [place].

## 2023-09-29 ENCOUNTER — OFFICE VISIT (OUTPATIENT)
Dept: FAMILY MEDICINE CLINIC | Age: 58
End: 2023-09-29
Payer: COMMERCIAL

## 2023-09-29 VITALS
WEIGHT: 241 LBS | BODY MASS INDEX: 38.73 KG/M2 | HEIGHT: 66 IN | OXYGEN SATURATION: 98 % | RESPIRATION RATE: 16 BRPM | HEART RATE: 67 BPM | SYSTOLIC BLOOD PRESSURE: 116 MMHG | DIASTOLIC BLOOD PRESSURE: 76 MMHG | TEMPERATURE: 97.1 F

## 2023-09-29 DIAGNOSIS — E03.9 ACQUIRED HYPOTHYROIDISM: ICD-10-CM

## 2023-09-29 DIAGNOSIS — R25.2 LEG CRAMPS: ICD-10-CM

## 2023-09-29 DIAGNOSIS — K21.9 GASTROESOPHAGEAL REFLUX DISEASE WITHOUT ESOPHAGITIS: ICD-10-CM

## 2023-09-29 PROCEDURE — 99213 OFFICE O/P EST LOW 20 MIN: CPT

## 2023-09-29 RX ORDER — FAMOTIDINE 20 MG/1
20 TABLET, FILM COATED ORAL 2 TIMES DAILY PRN
Qty: 60 TABLET | Refills: 1 | Status: SHIPPED | OUTPATIENT
Start: 2023-09-29

## 2023-09-29 RX ORDER — M-VIT,TX,IRON,MINS/CALC/FOLIC 27MG-0.4MG
1 TABLET ORAL DAILY
Qty: 30 TABLET | Refills: 11 | Status: SHIPPED | OUTPATIENT
Start: 2023-09-29 | End: 2024-09-28

## 2023-09-29 RX ORDER — LEVOTHYROXINE SODIUM 0.15 MG/1
150 TABLET ORAL DAILY
Qty: 30 TABLET | Refills: 5 | Status: SHIPPED
Start: 2023-09-29 | End: 2023-10-05

## 2023-09-29 NOTE — PROGRESS NOTES
[facility]  Department of [facility] Medicine  Family Medicine Residency Program    Lulú Calle (:  1965) is a 60 y.o. female,Established patient, here for evaluation of the following chief complaint(s):  Hypothyroidism, Health Maintenance (Declined all vaccines), Cough (X 2 weeks with runny nose on and off), and Leg Pain (Cramping bilateral legs)      ASSESSMENT/PLAN:  Patient's hypothyroidism controlled on Synthroid. Will check TSH and adjust dose as necessary. Discussed importance of dietary modifications to control GERD. Will refill Pepcid as below. Patient complaining of muscle cramps. Educated on preventative/abortive stretches as well as proper hydration. 1. Acquired hypothyroidism  - TSH; Future    2. Gastroesophageal reflux disease without esophagitis  - famotidine (PEPCID) 20 MG tablet; Take 1 tablet by mouth 2 times daily as needed (heartburn and pain)  Dispense: 60 tablet; Refill: 1    3. Leg cramps  - Multiple Vitamins-Minerals (THERAPEUTIC MULTIVITAMIN-MINERALS) tablet; Take 1 tablet by mouth daily  Dispense: 30 tablet; Refill: 11  - Comprehensive Metabolic Panel; Future  - Magnesium; Future       Return in about 3 months (around 2023) for GERD/chronic conditions. SUBJECTIVE/OBJECTIVE:    Hypothyroidism  On Synthroid 150 mcg  TSH 0.149 in 2023 while on same dose of synthroid  No heat intolerance, no hair/nail/skin changes    GERD  Has not been taking Pepcid because she ran out  Not avoiding food triggers  ~4-5 year off and on history  Symptoms are intermittent, often goes months without symptoms    Leg cramping  Middle of the night that wakes her up  Not noticing cramps during the day  Getting out of bed and drinking water helps      Review of Systems   Constitutional:  Negative for chills and fever. Respiratory:  Negative for chest tightness and shortness of breath. Cardiovascular:  Negative for chest pain and palpitations.

## 2023-10-02 LAB
ALBUMIN SERPL-MCNC: 4.2 G/DL (ref 3.5–5.2)
ALP BLD-CCNC: 87 U/L (ref 35–104)
ALT SERPL-CCNC: 31 U/L (ref 0–32)
ANION GAP SERPL CALCULATED.3IONS-SCNC: 8 MMOL/L (ref 7–16)
AST SERPL-CCNC: 26 U/L (ref 0–31)
BILIRUB SERPL-MCNC: 0.5 MG/DL (ref 0–1.2)
BUN BLDV-MCNC: 12 MG/DL (ref 6–20)
CALCIUM SERPL-MCNC: 9.1 MG/DL (ref 8.6–10.2)
CHLORIDE BLD-SCNC: 107 MMOL/L (ref 98–107)
CO2: 26 MMOL/L (ref 22–29)
CREAT SERPL-MCNC: 0.7 MG/DL (ref 0.5–1)
GFR SERPL CREATININE-BSD FRML MDRD: >60 ML/MIN/1.73M2
GLUCOSE BLD-MCNC: 105 MG/DL (ref 74–99)
MAGNESIUM: 2 MG/DL (ref 1.6–2.6)
POTASSIUM SERPL-SCNC: 4.9 MMOL/L (ref 3.5–5)
SODIUM BLD-SCNC: 141 MMOL/L (ref 132–146)
TOTAL PROTEIN: 7.3 G/DL (ref 6.4–8.3)
TSH SERPL DL<=0.05 MIU/L-ACNC: 0.12 UIU/ML (ref 0.27–4.2)

## 2023-10-05 RX ORDER — LEVOTHYROXINE SODIUM 137 UG/1
137 TABLET ORAL DAILY
Qty: 90 TABLET | Refills: 0 | Status: SHIPPED | OUTPATIENT
Start: 2023-10-05

## 2023-10-05 NOTE — RESULT ENCOUNTER NOTE
Labs show that thyroid is overtreated. Will reduce dose of Synthroid and send new medication to pharmacy.  Patient should have labs drawn in beginning of December to recheck thyroid

## 2023-11-05 ASSESSMENT — ENCOUNTER SYMPTOMS
DIARRHEA: 0
CONSTIPATION: 0
CHEST TIGHTNESS: 0
SHORTNESS OF BREATH: 0
BLOOD IN STOOL: 0

## 2023-12-26 ENCOUNTER — OFFICE VISIT (OUTPATIENT)
Dept: FAMILY MEDICINE CLINIC | Age: 58
End: 2023-12-26
Payer: COMMERCIAL

## 2023-12-26 VITALS
TEMPERATURE: 97.3 F | HEART RATE: 74 BPM | WEIGHT: 241.5 LBS | SYSTOLIC BLOOD PRESSURE: 112 MMHG | DIASTOLIC BLOOD PRESSURE: 78 MMHG | OXYGEN SATURATION: 98 % | BODY MASS INDEX: 38.81 KG/M2 | HEIGHT: 66 IN | RESPIRATION RATE: 16 BRPM

## 2023-12-26 DIAGNOSIS — E03.9 ACQUIRED HYPOTHYROIDISM: ICD-10-CM

## 2023-12-26 DIAGNOSIS — R25.2 LEG CRAMPS: Primary | ICD-10-CM

## 2023-12-26 DIAGNOSIS — H65.91 FLUID LEVEL BEHIND TYMPANIC MEMBRANE OF RIGHT EAR: ICD-10-CM

## 2023-12-26 DIAGNOSIS — K21.9 GASTROESOPHAGEAL REFLUX DISEASE WITHOUT ESOPHAGITIS: ICD-10-CM

## 2023-12-26 LAB
FOLATE: 13.5 NG/ML (ref 4.8–24.2)
TSH SERPL DL<=0.05 MIU/L-ACNC: 0.12 UIU/ML (ref 0.27–4.2)
VITAMIN B-12: 567 PG/ML (ref 211–946)

## 2023-12-26 PROCEDURE — 99213 OFFICE O/P EST LOW 20 MIN: CPT

## 2023-12-26 RX ORDER — FLUTICASONE PROPIONATE 50 MCG
2 SPRAY, SUSPENSION (ML) NASAL DAILY
Qty: 48 G | Refills: 1 | Status: SHIPPED | OUTPATIENT
Start: 2023-12-26

## 2023-12-26 RX ORDER — PANTOPRAZOLE SODIUM 40 MG/1
40 TABLET, DELAYED RELEASE ORAL
Qty: 90 TABLET | Refills: 1 | Status: SHIPPED | OUTPATIENT
Start: 2023-12-26

## 2023-12-26 NOTE — PROGRESS NOTES
[facility]  Precepting Note    Subjective:  FU GERD, hypothyroid, leg cramps, headaches    GERD  Pepcid BID not helping  Will use clinton or tums when flares.   Avoiding greasy and spicy foods and that has helped.   Black coffee in morning     Hypothyroidism  Follow up.   TSH low recently, dose changed, rechecking today.   Lab Results   Component Value Date    TSH 0.12 (L) 09/29/2023     Wt Readings from Last 3 Encounters:   12/26/23 109.5 kg (241 lb 8 oz)   09/29/23 109.3 kg (241 lb)   02/24/23 116.6 kg (257 lb)     Leg cramping has been stable since Sept.   Gets thigh cramps in middle of night.   Drinks apple cider vinegar and water and that helps and she goes back to sleep.   CMP, Mg normal last visit.   Stretching and hydration was encouraged.      Right OME  Left ear impacted.       ROS otherwise negative     Past medical, surgical, family and social history were reviewed, non-contributory, and unchanged unless otherwise stated.    Objective:    /78   Pulse 74   Temp 97.3 °F (36.3 °C) (Temporal)   Resp 16   Ht 1.676 m (5' 5.98\")   Wt 109.5 kg (241 lb 8 oz)   LMP 03/15/2015   SpO2 98%   BMI 39.00 kg/m²     Exam is as noted by resident with the following changes, additions or corrections:    General:  NAD; alert & oriented x 3   Heart:  RRR, no murmurs, gallops, or rubs.  Lungs:  CTA bilaterally, no wheeze, rales or rhonchi  Abd: bowel sounds present, no significant abd pain, nondistended, no masses  Extrem:  No clubbing, cyanosis, or edema    Assessment/Plan:  Hypothyroid  Recheck TSH on new dose.     GERD  Start Protonix in AM daily.   Encouraged to stop black coffee in morning on empty stomach  Stop pepcid.     Persistent leg cramping  B12/folate  Same recommendations as in HPI.     OME  Flonase.        Attending Physician Statement  I have reviewed the chart, including any radiology or labs. I have discussed the case, including pertinent history and exam 
pantoprazole (PROTONIX) 40 MG tablet Take 1 tablet by mouth every morning (before breakfast) 90 tablet 1    famotidine (PEPCID) 20 MG tablet Take 1 tablet by mouth 2 times daily as needed (heartburn and pain) 60 tablet 1    Multiple Vitamins-Minerals (THERAPEUTIC MULTIVITAMIN-MINERALS) tablet Take 1 tablet by mouth daily 30 tablet 11    levothyroxine (SYNTHROID) 125 MCG tablet Take 1 tablet by mouth daily 30 tablet 1     No current facility-administered medications for this visit.        Return to Office: No follow-ups on file.      This document may have been prepared at least partially through the use of voice recognition software. Although effort is taken to assure the accuracy of this document, it is possible that grammatical, syntax,  or spelling errors may occur.    Daksha Anderson MD  Family Medicine Resident PGY-3  1/1/2024

## 2023-12-29 DIAGNOSIS — E03.9 ACQUIRED HYPOTHYROIDISM: Primary | ICD-10-CM

## 2023-12-29 RX ORDER — LEVOTHYROXINE SODIUM 0.12 MG/1
125 TABLET ORAL DAILY
Qty: 30 TABLET | Refills: 1 | Status: SHIPPED | OUTPATIENT
Start: 2023-12-29

## 2024-01-01 ASSESSMENT — ENCOUNTER SYMPTOMS
COUGH: 0
NAUSEA: 0
SORE THROAT: 0
TROUBLE SWALLOWING: 0
SHORTNESS OF BREATH: 0
ABDOMINAL PAIN: 0
DIARRHEA: 0
VOMITING: 0
COLOR CHANGE: 0
CONSTIPATION: 0

## 2024-02-21 ENCOUNTER — NURSE ONLY (OUTPATIENT)
Dept: FAMILY MEDICINE CLINIC | Age: 59
End: 2024-02-21

## 2024-02-21 DIAGNOSIS — E03.9 ACQUIRED HYPOTHYROIDISM: ICD-10-CM

## 2024-02-21 LAB — TSH SERPL DL<=0.05 MIU/L-ACNC: 0.19 UIU/ML (ref 0.27–4.2)

## 2024-02-22 DIAGNOSIS — E03.9 ACQUIRED HYPOTHYROIDISM: Primary | ICD-10-CM

## 2024-02-22 RX ORDER — LEVOTHYROXINE SODIUM 112 UG/1
112 TABLET ORAL DAILY
Qty: 30 TABLET | Refills: 2 | Status: SHIPPED | OUTPATIENT
Start: 2024-02-22

## 2024-03-25 ENCOUNTER — HOSPITAL ENCOUNTER (OUTPATIENT)
Age: 59
Discharge: HOME | End: 2024-03-25
Payer: COMMERCIAL

## 2024-03-25 DIAGNOSIS — E03.9 ACQUIRED HYPOTHYROIDISM: ICD-10-CM

## 2024-03-25 LAB — TSH SERPL DL<=0.05 MIU/L-ACNC: 0.13 UIU/ML (ref 0.27–4.2)

## 2024-03-25 PROCEDURE — 36415 COLL VENOUS BLD VENIPUNCTURE: CPT

## 2024-03-25 PROCEDURE — 84443 ASSAY THYROID STIM HORMONE: CPT

## 2024-03-26 DIAGNOSIS — R25.2 LEG CRAMPS: ICD-10-CM

## 2024-03-26 DIAGNOSIS — K21.9 GASTROESOPHAGEAL REFLUX DISEASE WITHOUT ESOPHAGITIS: ICD-10-CM

## 2024-03-27 RX ORDER — FAMOTIDINE 20 MG/1
20 TABLET, FILM COATED ORAL 2 TIMES DAILY PRN
Qty: 60 TABLET | Refills: 1 | Status: SHIPPED | OUTPATIENT
Start: 2024-03-27

## 2024-03-27 RX ORDER — M-VIT,TX,IRON,MINS/CALC/FOLIC 27MG-0.4MG
1 TABLET ORAL DAILY
Qty: 30 TABLET | Refills: 11 | Status: SHIPPED | OUTPATIENT
Start: 2024-03-27 | End: 2025-03-27

## 2024-03-27 NOTE — TELEPHONE ENCOUNTER
Last Appointment   12/26/2023  Next Appointment  Visit date not found    Mychart message sent to advise patient to schedule routine follow up in 30-60 days

## 2024-03-28 ENCOUNTER — CLINICAL DOCUMENTATION (OUTPATIENT)
Dept: FAMILY MEDICINE CLINIC | Age: 59
End: 2024-03-28

## 2024-03-28 ENCOUNTER — TELEPHONE (OUTPATIENT)
Dept: FAMILY MEDICINE CLINIC | Age: 59
End: 2024-03-28

## 2024-03-28 DIAGNOSIS — E03.9 ACQUIRED HYPOTHYROIDISM: Primary | ICD-10-CM

## 2024-03-28 RX ORDER — LEVOTHYROXINE SODIUM 0.1 MG/1
100 TABLET ORAL DAILY
Qty: 30 TABLET | Refills: 1 | Status: SHIPPED | OUTPATIENT
Start: 2024-03-28

## 2024-03-28 NOTE — TELEPHONE ENCOUNTER
TSH result reviewed.  Levothyroxine dose decreased and new prescription was sent.  Repeat TSH in a couple weeks as ordered.  Patient's TSH values are inconsistent.  Please confirm correct dosing regimen with patient with regards to levothyroxine.  Patient needs to take medication on an empty stomach with water 30 minutes to 1 hour before eating.

## 2024-04-26 ENCOUNTER — HOSPITAL ENCOUNTER (OUTPATIENT)
Age: 59
Discharge: HOME | End: 2024-04-26
Payer: COMMERCIAL

## 2024-04-26 DIAGNOSIS — E03.9 ACQUIRED HYPOTHYROIDISM: ICD-10-CM

## 2024-04-26 LAB — TSH SERPL DL<=0.05 MIU/L-ACNC: 0.17 UIU/ML (ref 0.27–4.2)

## 2024-04-26 PROCEDURE — 84443 ASSAY THYROID STIM HORMONE: CPT

## 2024-04-26 PROCEDURE — 36415 COLL VENOUS BLD VENIPUNCTURE: CPT

## 2024-04-30 DIAGNOSIS — E03.9 HYPOTHYROIDISM, UNSPECIFIED TYPE: Primary | ICD-10-CM

## 2024-04-30 RX ORDER — LEVOTHYROXINE SODIUM 0.07 MG/1
75 TABLET ORAL DAILY
Qty: 90 TABLET | Refills: 1 | Status: CANCELLED | OUTPATIENT
Start: 2024-04-30

## 2024-05-01 DIAGNOSIS — E03.9 ACQUIRED HYPOTHYROIDISM: Primary | ICD-10-CM

## 2024-05-01 RX ORDER — LEVOTHYROXINE SODIUM 0.07 MG/1
75 TABLET ORAL DAILY
Qty: 50 TABLET | Refills: 0 | Status: SHIPPED | OUTPATIENT
Start: 2024-05-01

## 2024-06-08 ENCOUNTER — HOSPITAL ENCOUNTER (OUTPATIENT)
Age: 59
Discharge: HOME | End: 2024-06-08
Payer: COMMERCIAL

## 2024-06-08 DIAGNOSIS — E03.9 ACQUIRED HYPOTHYROIDISM: ICD-10-CM

## 2024-06-08 LAB — TSH SERPL DL<=0.05 MIU/L-ACNC: 2.27 UIU/ML (ref 0.27–4.2)

## 2024-06-08 PROCEDURE — 36415 COLL VENOUS BLD VENIPUNCTURE: CPT

## 2024-06-08 PROCEDURE — 84443 ASSAY THYROID STIM HORMONE: CPT

## 2024-06-25 ENCOUNTER — OFFICE VISIT (OUTPATIENT)
Dept: FAMILY MEDICINE CLINIC | Age: 59
End: 2024-06-25
Payer: COMMERCIAL

## 2024-06-25 VITALS
BODY MASS INDEX: 37.8 KG/M2 | RESPIRATION RATE: 17 BRPM | OXYGEN SATURATION: 100 % | TEMPERATURE: 97.8 F | HEIGHT: 66 IN | WEIGHT: 235.2 LBS | SYSTOLIC BLOOD PRESSURE: 123 MMHG | HEART RATE: 65 BPM | DIASTOLIC BLOOD PRESSURE: 82 MMHG

## 2024-06-25 DIAGNOSIS — K21.9 GASTROESOPHAGEAL REFLUX DISEASE WITHOUT ESOPHAGITIS: ICD-10-CM

## 2024-06-25 DIAGNOSIS — R63.5 WEIGHT GAIN, ABNORMAL: ICD-10-CM

## 2024-06-25 DIAGNOSIS — E03.9 ACQUIRED HYPOTHYROIDISM: ICD-10-CM

## 2024-06-25 DIAGNOSIS — R09.81 NASAL CONGESTION: Primary | ICD-10-CM

## 2024-06-25 PROCEDURE — S9470 NUTRITIONAL COUNSELING, DIET: HCPCS

## 2024-06-25 PROCEDURE — 99213 OFFICE O/P EST LOW 20 MIN: CPT

## 2024-06-25 RX ORDER — FLUTICASONE PROPIONATE 50 MCG
2 SPRAY, SUSPENSION (ML) NASAL DAILY
Qty: 48 G | Refills: 1 | Status: SHIPPED | OUTPATIENT
Start: 2024-06-25

## 2024-06-25 RX ORDER — PANTOPRAZOLE SODIUM 40 MG/1
40 TABLET, DELAYED RELEASE ORAL
Qty: 90 TABLET | Refills: 1 | Status: SHIPPED | OUTPATIENT
Start: 2024-06-25

## 2024-06-25 RX ORDER — LEVOTHYROXINE SODIUM 0.07 MG/1
75 TABLET ORAL DAILY
Qty: 50 TABLET | Refills: 0 | Status: SHIPPED | OUTPATIENT
Start: 2024-06-25

## 2024-06-25 NOTE — PROGRESS NOTES
[facility]  Department of Family Medicine  Family Medicine Residency Program      Patient: Concetta Mac 58 y.o. female     Date of Service: 24      Chief complaint:   Chief Complaint   Patient presents with    Thyroid Problem     Following up 6 wk       HISTORY OF PRESENTING ILLNESS     58 y.o. female presented to the clinic      Thyroid:  - is on synthroid 75 mcg daily for last 50 days.   - Tsh is 2.27 on .   - feels more hot , before she was cold.   - no diarrhea, + constipation.   - is not using anything for constipation, not want meds.   - no skin changes.   - somewhat palpitations.     Want to loss weight:  - exercising 60 minutes /wk.   - east once a day  - takes carbs.         Health Maintenance:  Health Maintenance Due   Topic Date Due    Hepatitis B vaccine (1 of 3 - 3-dose series) Never done    COVID-19 Vaccine (1) Never done    DTaP/Tdap/Td vaccine (1 - Tdap) Never done    Shingles vaccine (1 of 2) Never done     Past Medical History:      Diagnosis Date    BPPV (benign paroxysmal positional vertigo), left     Hypothyroidism      Past Surgical History:        Procedure Laterality Date    COLONOSCOPY  2017    Dr. Lynn, 10 years    FELY AND BSO (CERVIX REMOVED)  2015    Fibroids, Dr. Aburto    TONSILLECTOMY      UPPER GASTROINTESTINAL ENDOSCOPY  2017    Dr. Lynn     Allergies:    Patient has no known allergies.  Social History:   Social History     Socioeconomic History    Marital status: Single     Spouse name: Not on file    Number of children: 2    Years of education: 12    Highest education level: Not on file   Occupational History    Occupation: customer service   Tobacco Use    Smoking status: Former     Current packs/day: 0.00     Average packs/day: 0.5 packs/day for 2.0 years (1.0 ttl pk-yrs)     Types: Cigarettes     Start date: 6/10/1986     Quit date: 6/10/1988     Years since quittin.0    Smokeless tobacco: Never   Substance and Sexual

## 2024-06-25 NOTE — PROGRESS NOTES
S: 58 y.o. female with   Chief Complaint   Patient presents with    Thyroid Problem     Following up 6 wk       59 yo female here for thyroid fu.  Labs most recently done earlier this month. TSH 2.7.  Wants to lose weight. Walking 60 minutes per week.    O: VS:  height is 1.676 m (5' 5.98\") and weight is 106.7 kg (235 lb 3.2 oz). Her temporal temperature is 97.8 °F (36.6 °C). Her blood pressure is 123/82 and her pulse is 65. Her respiration is 17 and oxygen saturation is 100%.   BP Readings from Last 3 Encounters:   06/25/24 123/82   12/26/23 112/78   09/29/23 116/76     See resident note      Impression/Plan:   1) Hypothyroid - continue current dose of levothyroxine.  2) Obesity   Link with nutritionist.   Handouts given as well for diet and exercise.   Increase exercise - Build up gradually to 30-45 minutes per day  3) FU 3 months      Health Maintenance Due   Topic Date Due    Hepatitis B vaccine (1 of 3 - 3-dose series) Never done    COVID-19 Vaccine (1) Never done    DTaP/Tdap/Td vaccine (1 - Tdap) Never done    Shingles vaccine (1 of 2) Never done         Attending Physician Statement  I have discussed the case, including pertinent history and exam findings with the resident. I agree with the documented assessment and plan.      Abdon Ely MD

## 2024-06-26 ENCOUNTER — CARE COORDINATION (OUTPATIENT)
Dept: CARE COORDINATION | Age: 59
End: 2024-06-26

## 2024-06-26 NOTE — CARE COORDINATION
RD received referral from PCP for obesity. RD contacted Concetta Mac and left voicemail regarding Dietitian referral. Left call back number and will follow up as appropriate.         Patricia Leo RDN, LD  [phone]

## 2024-06-28 ENCOUNTER — CARE COORDINATION (OUTPATIENT)
Dept: CARE COORDINATION | Age: 59
End: 2024-06-28

## 2024-06-28 NOTE — CARE COORDINATION
Contacted Concetta Mac and left voicemail regarding Dietitian referral. Left call back number and will follow up as appropriate.         Patricia Leo RDN, LD  [phone]

## 2024-07-01 ENCOUNTER — CARE COORDINATION (OUTPATIENT)
Dept: CARE COORDINATION | Age: 59
End: 2024-07-01

## 2024-07-01 NOTE — CARE COORDINATION
Contacted Concetta Mac and left voicemail regarding Dietitian referral. Left call back number. RD outreached 6/26/24, 6/28/24 and today 7/1/24- left VM all three outreaches. No additional outreach attempts scheduled at this time. RD will continue to follow/assist with patient return call.       Patricia Leo RDN, LD  [phone]

## 2024-08-07 DIAGNOSIS — K21.9 GASTROESOPHAGEAL REFLUX DISEASE WITHOUT ESOPHAGITIS: ICD-10-CM

## 2024-08-07 DIAGNOSIS — E03.9 ACQUIRED HYPOTHYROIDISM: ICD-10-CM

## 2024-08-08 RX ORDER — LEVOTHYROXINE SODIUM 0.07 MG/1
75 TABLET ORAL DAILY
Qty: 60 TABLET | Refills: 0 | Status: SHIPPED | OUTPATIENT
Start: 2024-08-08

## 2024-08-08 RX ORDER — PANTOPRAZOLE SODIUM 40 MG/1
40 TABLET, DELAYED RELEASE ORAL
Qty: 90 TABLET | Refills: 1 | Status: SHIPPED | OUTPATIENT
Start: 2024-08-08

## 2024-08-08 NOTE — TELEPHONE ENCOUNTER
Last Appointment   6/25/2024  Next Appointment  Visit date not found       Return to Office: Return in about 3 months (around 9/25/2024) for THYROID.

## 2024-09-27 ENCOUNTER — TELEPHONE (OUTPATIENT)
Dept: FAMILY MEDICINE CLINIC | Age: 59
End: 2024-09-27

## 2024-09-27 NOTE — TELEPHONE ENCOUNTER
Last Appointment   6/25/2024  Next Appointment  10/11/2024    Patient requesting lab order be placed for her TSH so that she can have these drawn  Prior to her upcoming appointment.

## 2024-09-30 DIAGNOSIS — E03.9 ACQUIRED HYPOTHYROIDISM: Primary | ICD-10-CM

## 2024-10-05 ENCOUNTER — HOSPITAL ENCOUNTER (OUTPATIENT)
Age: 59
Discharge: HOME | End: 2024-10-05
Payer: COMMERCIAL

## 2024-10-05 DIAGNOSIS — E03.9 ACQUIRED HYPOTHYROIDISM: ICD-10-CM

## 2024-10-05 LAB
T3 SERPL-MCNC: 71 NG/DL (ref 80–200)
T4 FREE SERPL-MCNC: 1.1 NG/DL (ref 0.9–1.7)
TSH SERPL DL<=0.05 MIU/L-ACNC: 8.69 UIU/ML (ref 0.27–4.2)

## 2024-10-05 PROCEDURE — 84439 ASSAY OF FREE THYROXINE: CPT

## 2024-10-05 PROCEDURE — 36415 COLL VENOUS BLD VENIPUNCTURE: CPT

## 2024-10-05 PROCEDURE — 84443 ASSAY THYROID STIM HORMONE: CPT

## 2024-10-05 PROCEDURE — 84480 ASSAY TRIIODOTHYRONINE (T3): CPT

## 2024-10-11 ENCOUNTER — OFFICE VISIT (OUTPATIENT)
Dept: FAMILY MEDICINE CLINIC | Age: 59
End: 2024-10-11
Payer: COMMERCIAL

## 2024-10-11 VITALS
DIASTOLIC BLOOD PRESSURE: 80 MMHG | SYSTOLIC BLOOD PRESSURE: 127 MMHG | HEART RATE: 69 BPM | TEMPERATURE: 97.3 F | OXYGEN SATURATION: 98 % | BODY MASS INDEX: 37.93 KG/M2 | WEIGHT: 236 LBS | RESPIRATION RATE: 16 BRPM | HEIGHT: 66 IN

## 2024-10-11 DIAGNOSIS — L30.9 DERMATITIS: Primary | ICD-10-CM

## 2024-10-11 DIAGNOSIS — E03.9 ACQUIRED HYPOTHYROIDISM: ICD-10-CM

## 2024-10-11 DIAGNOSIS — R35.0 URINARY FREQUENCY: ICD-10-CM

## 2024-10-11 LAB
BACTERIA: ABNORMAL
BILIRUBIN, URINE: NEGATIVE
COLOR, UA: YELLOW
GLUCOSE URINE: NEGATIVE MG/DL
KETONES, URINE: NEGATIVE MG/DL
LEUKOCYTE ESTERASE, URINE: NEGATIVE
NITRITE, URINE: NEGATIVE
PH, URINE: 6 (ref 5–9)
PROTEIN UA: NEGATIVE MG/DL
RBC UA: ABNORMAL /HPF
SPECIFIC GRAVITY UA: >1.03 (ref 1–1.03)
TURBIDITY: CLEAR
URINE HGB: NEGATIVE
UROBILINOGEN, URINE: 0.2 EU/DL (ref 0–1)
WBC UA: ABNORMAL /HPF

## 2024-10-11 PROCEDURE — 99213 OFFICE O/P EST LOW 20 MIN: CPT

## 2024-10-11 RX ORDER — LEVOTHYROXINE SODIUM 88 UG/1
88 TABLET ORAL DAILY
Qty: 30 TABLET | Refills: 0 | Status: SHIPPED | OUTPATIENT
Start: 2024-10-11

## 2024-10-11 RX ORDER — CETIRIZINE HYDROCHLORIDE 10 MG/1
10 TABLET ORAL DAILY
Qty: 30 TABLET | Refills: 0 | Status: SHIPPED | OUTPATIENT
Start: 2024-10-11

## 2024-10-11 RX ORDER — TRIAMCINOLONE ACETONIDE 1 MG/G
CREAM TOPICAL
Qty: 45 G | Refills: 0 | Status: SHIPPED | OUTPATIENT
Start: 2024-10-11

## 2024-10-11 RX ORDER — KETOCONAZOLE 20 MG/G
CREAM TOPICAL
Qty: 30 G | Refills: 1 | Status: SHIPPED | OUTPATIENT
Start: 2024-10-11

## 2024-10-11 NOTE — PROGRESS NOTES
S: 59 y.o. female with   Chief Complaint   Patient presents with    Thyroid Problem    Other     Declined flu shot    Rash     On neck for about a month on and off.    Congestion       Pt is here for her thyroid.  She is taking her thyroid meds correctly.  She has been having a rash for about 1 month and it is itchy.    Has concerns for UTI because of increased frequency    O: VS:  height is 1.676 m (5' 5.98\") and weight is 107 kg (236 lb). Her temporal temperature is 97.3 °F (36.3 °C). Her blood pressure is 127/80 and her pulse is 69. Her respiration is 16 and oxygen saturation is 98%.   BP Readings from Last 3 Encounters:   10/11/24 127/80   06/25/24 123/82   12/26/23 112/78     See resident note    Impression/Plan:   1) hypothyroidism - increase to 88.  TSH in 6 week.  2) seb derm - ketoconazole.    3) urinary freq - check UA      Health Maintenance Due   Topic Date Due    DTaP/Tdap/Td vaccine (1 - Tdap) Never done    Shingles vaccine (1 of 2) Never done    Respiratory Syncytial Virus (RSV) Pregnant or age 60 yrs+ (1 - 1-dose 60+ series) Never done    Flu vaccine (1) Never done    COVID-19 Vaccine (1 - 2023-24 season) Never done         Attending Physician Statement  I have discussed the case, including pertinent history and exam findings with the resident.  I agree with the documented assessment and plan.      Manasa Gauhtier MD

## 2024-10-11 NOTE — PROGRESS NOTES
[facility]  Department of Family Medicine  Family Medicine Residency Program      Patient: Concetta Mac 59 y.o. female     Date of Service: 10/11/24      Chief complaint:   Chief Complaint   Patient presents with    Thyroid Problem    Other     Declined flu shot    Rash     On neck for about a month on and off.    Congestion       HISTORY OF PRESENTING ILLNESS     59 y.o. female presented to the clinic      Hypothyroid:  - using synthroid 75 mcg daily.   - recent labs shows 8.69, T3 is 71.   - wants to increase.     Rash;  - on neck  - 1 month.  - intermittent.   - itchiness on face.  - used benadryl--> not helped.       Uti concerns:  - going too often to pee.  - no itchiness, in burning.       Health Maintenance:  Health Maintenance Due   Topic Date Due    DTaP/Tdap/Td vaccine (1 - Tdap) Never done    Shingles vaccine (1 of 2) Never done    Respiratory Syncytial Virus (RSV) Pregnant or age 60 yrs+ (1 - 1-dose 60+ series) Never done    Flu vaccine (1) Never done    COVID-19 Vaccine (1 - 2023-24 season) Never done     Past Medical History:      Diagnosis Date    BPPV (benign paroxysmal positional vertigo), left     Hypothyroidism      Past Surgical History:        Procedure Laterality Date    COLONOSCOPY  01/20/2017    Dr. Lynn, 10 years    FELY AND BSO (CERVIX REMOVED)  06/17/2015    Fibroids, Dr. Aburto    TONSILLECTOMY      UPPER GASTROINTESTINAL ENDOSCOPY  01/20/2017    Dr. Lynn     Allergies:    Patient has no known allergies.  Social History:   Social History     Socioeconomic History    Marital status: Single     Spouse name: Not on file    Number of children: 2    Years of education: 12    Highest education level: Not on file   Occupational History    Occupation: customer service   Tobacco Use    Smoking status: Former     Current packs/day: 0.00     Average packs/day: 0.5 packs/day for 2.0 years (1.0 ttl pk-yrs)     Types: Cigarettes     Start date: 6/10/1986     Quit date:

## 2024-11-13 DIAGNOSIS — E03.9 ACQUIRED HYPOTHYROIDISM: ICD-10-CM

## 2024-11-13 NOTE — TELEPHONE ENCOUNTER
Name of Medication(s) Requested:  Requested Prescriptions     Pending Prescriptions Disp Refills    levothyroxine (SYNTHROID) 88 MCG tablet 30 tablet 0     Sig: Take 1 tablet by mouth daily       Medication is on current medication list Yes    Dosage and directions were verified? Yes    Quantity verified: 30 day supply     Pharmacy Verified?  Yes    Last Appointment:  10/11/2024    Future appts:  No future appointments.     (If no appt send self scheduling link. .REFILLAPPT)  Scheduling request sent?     [x] Yes  [] No    Does patient need updated?  [] Yes  [x] No

## 2024-11-14 RX ORDER — LEVOTHYROXINE SODIUM 88 UG/1
88 TABLET ORAL DAILY
Qty: 30 TABLET | Refills: 1 | Status: SHIPPED | OUTPATIENT
Start: 2024-11-14

## 2024-12-07 ENCOUNTER — HOSPITAL ENCOUNTER (OUTPATIENT)
Age: 59
Discharge: HOME | End: 2024-12-07
Payer: COMMERCIAL

## 2024-12-07 DIAGNOSIS — E03.9 ACQUIRED HYPOTHYROIDISM: ICD-10-CM

## 2024-12-07 LAB — TSH SERPL DL<=0.05 MIU/L-ACNC: 7.1 UIU/ML (ref 0.27–4.2)

## 2024-12-07 PROCEDURE — 36415 COLL VENOUS BLD VENIPUNCTURE: CPT

## 2024-12-07 PROCEDURE — 84443 ASSAY THYROID STIM HORMONE: CPT

## 2024-12-10 DIAGNOSIS — E03.9 ACQUIRED HYPOTHYROIDISM: ICD-10-CM

## 2024-12-10 RX ORDER — LEVOTHYROXINE SODIUM 100 UG/1
100 TABLET ORAL DAILY
Qty: 30 TABLET | Refills: 1 | Status: SHIPPED | OUTPATIENT
Start: 2024-12-10

## 2025-01-29 ENCOUNTER — TELEPHONE (OUTPATIENT)
Dept: FAMILY MEDICINE CLINIC | Age: 60
End: 2025-01-29

## 2025-01-29 NOTE — TELEPHONE ENCOUNTER
Patient called in requesting orders be placed for repeat TSH, T3, T4 Free as was discussed for her to have done before her next follow up with you on 2/24/2025.

## 2025-01-29 NOTE — TELEPHONE ENCOUNTER
----- Message from Guido RAMIREZ sent at 1/29/2025 11:39 AM EST -----  Regarding: [facility] Referral Request  [facility] Referral Request    Reason for referral request: Lab/Test Order    Specialist/Lab/Test patient is requesting (if known): blood work    Specialist Phone Number (if applicable):    Additional Information patient needs an order for blood work   --------------------------------------------------------------------------------------------------------------------------    Relationship to Patient: Self     Call Back Information: OK to leave message on voicemail  Preferred Call Back Number: Phone [phone]

## 2025-01-30 DIAGNOSIS — E03.9 ACQUIRED HYPOTHYROIDISM: Primary | ICD-10-CM

## 2025-02-22 ENCOUNTER — HOSPITAL ENCOUNTER (OUTPATIENT)
Age: 60
Discharge: HOME | End: 2025-02-22
Payer: COMMERCIAL

## 2025-02-22 DIAGNOSIS — E03.9 ACQUIRED HYPOTHYROIDISM: ICD-10-CM

## 2025-02-22 LAB — TSH SERPL DL<=0.05 MIU/L-ACNC: 1.96 UIU/ML (ref 0.27–4.2)

## 2025-02-22 PROCEDURE — 84443 ASSAY THYROID STIM HORMONE: CPT

## 2025-02-22 PROCEDURE — 36415 COLL VENOUS BLD VENIPUNCTURE: CPT

## 2025-02-27 ASSESSMENT — PATIENT HEALTH QUESTIONNAIRE - PHQ9
2. FEELING DOWN, DEPRESSED OR HOPELESS: NOT AT ALL
2. FEELING DOWN, DEPRESSED OR HOPELESS: NOT AT ALL
SUM OF ALL RESPONSES TO PHQ QUESTIONS 1-9: 0
SUM OF ALL RESPONSES TO PHQ9 QUESTIONS 1 & 2: 0
SUM OF ALL RESPONSES TO PHQ QUESTIONS 1-9: 0
1. LITTLE INTEREST OR PLEASURE IN DOING THINGS: NOT AT ALL
1. LITTLE INTEREST OR PLEASURE IN DOING THINGS: NOT AT ALL
SUM OF ALL RESPONSES TO PHQ QUESTIONS 1-9: 0
SUM OF ALL RESPONSES TO PHQ QUESTIONS 1-9: 0

## 2025-02-28 ENCOUNTER — OFFICE VISIT (OUTPATIENT)
Dept: FAMILY MEDICINE CLINIC | Age: 60
End: 2025-02-28
Payer: COMMERCIAL

## 2025-02-28 VITALS
RESPIRATION RATE: 18 BRPM | SYSTOLIC BLOOD PRESSURE: 123 MMHG | HEART RATE: 67 BPM | WEIGHT: 239 LBS | OXYGEN SATURATION: 96 % | HEIGHT: 66 IN | TEMPERATURE: 98 F | DIASTOLIC BLOOD PRESSURE: 79 MMHG | BODY MASS INDEX: 38.41 KG/M2

## 2025-02-28 DIAGNOSIS — E66.812 CLASS 2 OBESITY WITH BODY MASS INDEX (BMI) OF 38.0 TO 38.9 IN ADULT, UNSPECIFIED OBESITY TYPE, UNSPECIFIED WHETHER SERIOUS COMORBIDITY PRESENT: ICD-10-CM

## 2025-02-28 DIAGNOSIS — H92.03 OTALGIA OF BOTH EARS: ICD-10-CM

## 2025-02-28 DIAGNOSIS — Z12.31 BREAST CANCER SCREENING BY MAMMOGRAM: ICD-10-CM

## 2025-02-28 DIAGNOSIS — E03.9 ACQUIRED HYPOTHYROIDISM: ICD-10-CM

## 2025-02-28 DIAGNOSIS — Z00.00 ANNUAL PHYSICAL EXAM: Primary | ICD-10-CM

## 2025-02-28 DIAGNOSIS — R09.81 NASAL CONGESTION: ICD-10-CM

## 2025-02-28 DIAGNOSIS — K21.9 GASTROESOPHAGEAL REFLUX DISEASE WITHOUT ESOPHAGITIS: ICD-10-CM

## 2025-02-28 LAB
HCT VFR BLD CALC: 43.1 % (ref 34–48)
HEMOGLOBIN: 13.5 G/DL (ref 11.5–15.5)
MCH RBC QN AUTO: 28.5 PG (ref 26–35)
MCHC RBC AUTO-ENTMCNC: 31.3 G/DL (ref 32–34.5)
MCV RBC AUTO: 90.9 FL (ref 80–99.9)
PDW BLD-RTO: 13.7 % (ref 11.5–15)
PLATELET # BLD: 220 K/UL (ref 130–450)
PMV BLD AUTO: 10.7 FL (ref 7–12)
RBC # BLD: 4.74 M/UL (ref 3.5–5.5)
WBC # BLD: 8.5 K/UL (ref 4.5–11.5)

## 2025-02-28 PROCEDURE — 90715 TDAP VACCINE 7 YRS/> IM: CPT | Performed by: FAMILY MEDICINE

## 2025-02-28 PROCEDURE — 90471 IMMUNIZATION ADMIN: CPT | Performed by: FAMILY MEDICINE

## 2025-02-28 PROCEDURE — 99396 PREV VISIT EST AGE 40-64: CPT

## 2025-02-28 RX ORDER — M-VIT,TX,IRON,MINS/CALC/FOLIC 27MG-0.4MG
1 TABLET ORAL DAILY
Qty: 90 TABLET | Refills: 1 | Status: SHIPPED | OUTPATIENT
Start: 2025-02-28 | End: 2025-08-27

## 2025-02-28 RX ORDER — CETIRIZINE HYDROCHLORIDE 10 MG/1
10 TABLET ORAL DAILY
Qty: 90 TABLET | Refills: 1 | Status: SHIPPED | OUTPATIENT
Start: 2025-02-28

## 2025-02-28 RX ORDER — FAMOTIDINE 20 MG/1
20 TABLET, FILM COATED ORAL 2 TIMES DAILY PRN
Qty: 90 TABLET | Refills: 1 | Status: SHIPPED | OUTPATIENT
Start: 2025-02-28

## 2025-02-28 RX ORDER — FLUTICASONE PROPIONATE 50 MCG
2 SPRAY, SUSPENSION (ML) NASAL DAILY
Qty: 48 G | Refills: 1 | Status: CANCELLED | OUTPATIENT
Start: 2025-02-28

## 2025-02-28 RX ORDER — FLUTICASONE PROPIONATE 50 MCG
2 SPRAY, SUSPENSION (ML) NASAL DAILY
Qty: 48 G | Refills: 1 | Status: SHIPPED | OUTPATIENT
Start: 2025-02-28

## 2025-02-28 SDOH — ECONOMIC STABILITY: FOOD INSECURITY: WITHIN THE PAST 12 MONTHS, THE FOOD YOU BOUGHT JUST DIDN'T LAST AND YOU DIDN'T HAVE MONEY TO GET MORE.: NEVER TRUE

## 2025-02-28 SDOH — ECONOMIC STABILITY: FOOD INSECURITY: WITHIN THE PAST 12 MONTHS, YOU WORRIED THAT YOUR FOOD WOULD RUN OUT BEFORE YOU GOT MONEY TO BUY MORE.: NEVER TRUE

## 2025-02-28 NOTE — PROGRESS NOTES
[facility]  Precepting Note    Subjective:  Annual physical  Mammo due this year  Colonoscopy in 2017, due in 2027  Agrees to tetanus     Recent URI issues seen in urgent care  Has some ear pain  Nonsmoker  Contemplative about pneumovax     Hypothyroid  Stable at this time, labs are stable  Doing well     ROS otherwise negative    Past medical, surgical, family and social history were reviewed, non-contributory, and unchanged unless otherwise stated.    Objective:    /79   Pulse 67   Temp 98 °F (36.7 °C)   Resp 18   Ht 1.676 m (5' 6\")   Wt 108.4 kg (239 lb)   LMP 03/15/2015   SpO2 96%   BMI 38.58 kg/m²     Exam is as noted by resident with the following changes, additions or corrections:    General:  NAD; alert & oriented x 3   ENT: Serous effusion R ear, nontender; L TM occluded   Nasal congestion  Neck No thyromegaly   Heart:  RRR, no murmurs, gallops, or rubs.  Lungs:  CTA bilaterally, no wheeze, rales or rhonchi  Abd: bowel sounds present, nontender, nondistended, no masses  Extrem:  No clubbing, cyanosis, or edema    Assessment/Plan:    Annual physical   Routine labs, mammo, TdaP     Attending Physician Statement  I have reviewed the chart, including any radiology or labs, and have seen the patient with the resident(s).  I personally reviewed and performed key elements of the history and exam.  I agree with the assessment, plan and orders as documented by the resident.  Please refer to the resident note for additional information.      Electronically signed by Abdon Stevens MD on 2/28/2025 at 10:54 AM    
This patient was screened with SUBS screening tool.   On 2/28/2025 the patient has a Negative Screen  
is stable. No side effects.       Mammogram was done in 2023, was normal.     Colonoscopy was done in 2017, was normal FU in 10 yrs.     Having ear pain b/l since her URI, no fever, discharge.    Refused pneumococcal , flu, covid vaccine, willing for Tdap.     Review of Systems   Constitutional:  Negative for chills and fever.   HENT:  Positive for ear pain.    Respiratory:  Negative for cough, chest tightness and shortness of breath.    Cardiovascular:  Negative for chest pain and palpitations.   Gastrointestinal:  Negative for abdominal pain, constipation, diarrhea, nausea and vomiting.   Genitourinary:  Negative for dysuria and frequency.   Musculoskeletal:  Negative for gait problem and joint swelling.   Skin:  Negative for rash and wound.   Hematological:  Does not bruise/bleed easily.   All other systems reviewed and are negative.      No Known Allergies  Prior to Visit Medications    Medication Sig Taking? Authorizing Provider   cetirizine (ZYRTEC) 10 MG tablet Take 1 tablet by mouth daily Yes Tia Sawyer MD   Multiple Vitamins-Minerals (THERAPEUTIC MULTIVITAMIN-MINERALS) tablet Take 1 tablet by mouth daily Yes Tia Sawyer MD   famotidine (PEPCID) 20 MG tablet Take 1 tablet by mouth 2 times daily as needed (heartburn and pain) Yes Tia Sawyer MD   fluticasone (FLONASE) 50 MCG/ACT nasal spray 2 sprays by Each Nostril route daily Yes Tia Sawyer MD   levothyroxine (SYNTHROID) 100 MCG tablet Take 1 tablet by mouth daily Yes Tia Sawyer MD   pantoprazole (PROTONIX) 40 MG tablet Take 1 tablet by mouth every morning (before breakfast) Yes Tia Sawyer MD     Past Medical History:   Diagnosis Date    BPPV (benign paroxysmal positional vertigo), left     Hypothyroidism      Past Surgical History:   Procedure Laterality Date    COLONOSCOPY  01/20/2017    Dr. Lynn, 10 years    FELY AND BSO (CERVIX REMOVED)  06/17/2015    Fibroids, Dr. Aburto    TONSILLECTOMY      UPPER GASTROINTESTINAL ENDOSCOPY

## 2025-03-01 LAB
ALBUMIN: 4 G/DL (ref 3.5–5.2)
ALP BLD-CCNC: 79 U/L (ref 35–104)
ALT SERPL-CCNC: 18 U/L (ref 0–32)
ANION GAP SERPL CALCULATED.3IONS-SCNC: 14 MMOL/L (ref 7–16)
AST SERPL-CCNC: 22 U/L (ref 0–31)
BILIRUB SERPL-MCNC: 0.3 MG/DL (ref 0–1.2)
BUN BLDV-MCNC: 16 MG/DL (ref 6–23)
CALCIUM SERPL-MCNC: 9.3 MG/DL (ref 8.6–10.2)
CHLORIDE BLD-SCNC: 104 MMOL/L (ref 98–107)
CHOLESTEROL, TOTAL: 183 MG/DL
CO2: 24 MMOL/L (ref 22–29)
CREAT SERPL-MCNC: 0.8 MG/DL (ref 0.5–1)
GFR, ESTIMATED: >90 ML/MIN/1.73M2
GLUCOSE BLD-MCNC: 79 MG/DL (ref 74–99)
HBA1C MFR BLD: 5.1 % (ref 4–5.6)
HDLC SERPL-MCNC: 48 MG/DL
LDL CHOLESTEROL: 119 MG/DL
POTASSIUM SERPL-SCNC: 5 MMOL/L (ref 3.5–5)
SODIUM BLD-SCNC: 142 MMOL/L (ref 132–146)
T3 TOTAL: 121 NG/DL (ref 80–200)
T4 FREE: 1.7 NG/DL (ref 0.9–1.7)
TOTAL PROTEIN: 7.2 G/DL (ref 6.4–8.3)
TRIGL SERPL-MCNC: 78 MG/DL
TSH SERPL DL<=0.05 MIU/L-ACNC: 0.7 UIU/ML (ref 0.27–4.2)
VLDLC SERPL CALC-MCNC: 16 MG/DL

## 2025-03-03 ENCOUNTER — TELEPHONE (OUTPATIENT)
Dept: BARIATRICS/WEIGHT MGMT | Age: 60
End: 2025-03-03

## 2025-03-03 NOTE — TELEPHONE ENCOUNTER
A first call was made in an attempt to reach this patient for a referral we received. I left a detailed voicemail to call our office to schedule an initial consult.    If insurance covers - ok to schedule

## 2025-03-04 ENCOUNTER — TELEPHONE (OUTPATIENT)
Dept: BARIATRICS/WEIGHT MGMT | Age: 60
End: 2025-03-04

## 2025-03-05 DIAGNOSIS — E03.9 ACQUIRED HYPOTHYROIDISM: ICD-10-CM

## 2025-03-07 RX ORDER — LEVOTHYROXINE SODIUM 100 UG/1
100 TABLET ORAL DAILY
Qty: 30 TABLET | Refills: 1 | Status: SHIPPED | OUTPATIENT
Start: 2025-03-07

## 2025-03-07 NOTE — TELEPHONE ENCOUNTER
Name of Medication(s) Requested:  Requested Prescriptions     Pending Prescriptions Disp Refills    levothyroxine (SYNTHROID) 100 MCG tablet 30 tablet 1     Sig: Take 1 tablet by mouth daily       Medication is on current medication list Yes    Dosage and directions were verified? Yes    Quantity verified: 90 day supply     Pharmacy Verified?  Yes    Last Appointment:  2/28/2025    Future appts:  Future Appointments   Date Time Provider Department Center   4/8/2025 11:45 AM Henrietta Cortez APRN - CNS Surg [facility]        (If no appt send self scheduling link. .REFILLAPPT)  Scheduling request sent?     [] Yes  [x] No    Does patient need updated?  [] Yes  [x] No

## 2025-03-13 DIAGNOSIS — K21.9 GASTROESOPHAGEAL REFLUX DISEASE WITHOUT ESOPHAGITIS: ICD-10-CM

## 2025-03-14 RX ORDER — PANTOPRAZOLE SODIUM 40 MG/1
40 TABLET, DELAYED RELEASE ORAL
Qty: 90 TABLET | Refills: 1 | Status: SHIPPED | OUTPATIENT
Start: 2025-03-14

## 2025-03-14 NOTE — TELEPHONE ENCOUNTER
Name of Medication(s) Requested:  Requested Prescriptions     Pending Prescriptions Disp Refills    pantoprazole (PROTONIX) 40 MG tablet 90 tablet 1     Sig: Take 1 tablet by mouth every morning (before breakfast)       Medication is on current medication list Yes    Dosage and directions were verified? Yes    Quantity verified: 90 day supply     Pharmacy Verified?  Yes    Last Appointment:  2/28/2025    Future appts:  Future Appointments   Date Time Provider Department Center   4/8/2025 11:45 AM Henrietta Cortez APRN - CNS Surg [facility]        (If no appt send self scheduling link. .REFILLAPPT)  Scheduling request sent?     [] Yes  [x] No    Does patient need updated?  [] Yes  [x] No

## 2025-04-08 ENCOUNTER — OFFICE VISIT (OUTPATIENT)
Dept: BARIATRICS/WEIGHT MGMT | Age: 60
End: 2025-04-08
Payer: COMMERCIAL

## 2025-04-08 VITALS
HEIGHT: 64 IN | HEART RATE: 64 BPM | DIASTOLIC BLOOD PRESSURE: 70 MMHG | WEIGHT: 240.2 LBS | BODY MASS INDEX: 41.01 KG/M2 | SYSTOLIC BLOOD PRESSURE: 140 MMHG | TEMPERATURE: 96.9 F

## 2025-04-08 DIAGNOSIS — E66.813 CLASS 3 SEVERE OBESITY DUE TO EXCESS CALORIES WITH SERIOUS COMORBIDITY AND BODY MASS INDEX (BMI) OF 40.0 TO 44.9 IN ADULT: ICD-10-CM

## 2025-04-08 DIAGNOSIS — K21.9 GASTROESOPHAGEAL REFLUX DISEASE WITHOUT ESOPHAGITIS: Primary | ICD-10-CM

## 2025-04-08 PROCEDURE — 99205 OFFICE O/P NEW HI 60 MIN: CPT | Performed by: CLINICAL NURSE SPECIALIST

## 2025-04-08 PROCEDURE — 99202 OFFICE O/P NEW SF 15 MIN: CPT

## 2025-04-08 RX ORDER — PHENTERMINE HYDROCHLORIDE 37.5 MG/1
TABLET ORAL
Qty: 30 TABLET | Refills: 0 | Status: SHIPPED | OUTPATIENT
Start: 2025-04-08 | End: 2025-05-08

## 2025-04-08 ASSESSMENT — ENCOUNTER SYMPTOMS
BACK PAIN: 1
COUGH: 0
CONSTIPATION: 1
SHORTNESS OF BREATH: 0
DIARRHEA: 1
VOMITING: 0
NAUSEA: 0

## 2025-04-08 NOTE — PROGRESS NOTES
CC -   GERD , Obesity    BACKGROUND -   First visit: 04/08/2025    Obesity   Began in 1992 when she was diagnosed with thyroid  problem  Initial BMI 41.88, Wt 240.2 lbs Ht 5'3.5\"  HS Grad wt 135 lbs   Lowest   wt 180 lbs   Highest  wt 252 lbs  Pattern of wt gain: gradual   Wt change past yr: fluctuating up & down 10 lbs  Most wt lost: 50 lbs  healthy eating  Other diets attempted: Keto, Low carb, low sugar     Desire to lose weight: 10/10  Problem posed by appetite: 10/10 Brain hunger~ Boredom eating    Initial Diet:    Number of meals per day - 2    Number of snacks per day - 3    Meal volume - 10\" plate, yes  seconds    Fast food/convenience store - 3-4 x/week    Restaurants (not fast food) - 0 x/week   Sweets - 4 d/week little Corin, donut   Chips - 0 d/week   Crackers/pretzels - 0 d/week   Nuts - 0 d/week   Peanut Butter - 0 d/week   Popcorn - 0 d/week   Dried fruit - 0 d/week   Whole fruit - 0 d/week   Breakfast cereal - 0 d/week   Granola/Protein/Energy bar - 0 d/week   Sugar sweetened beverages - none   Protein - Protein powder ( 23 gm) 5 x week    Fiber - No supplements   Multivitamin -none    Exercise:    Gym membership - no     Walking - yes to from work 5,000-6,000 steps     Running - no    Resistance - yes 2-3 x week     Aerobic class - no  ______________________________    [facility] -  Past Medical History:   Diagnosis Date    BPPV (benign paroxysmal positional vertigo), left     Hypothyroidism      Current Outpatient Medications   Medication Sig Dispense Refill    pantoprazole (PROTONIX) 40 MG tablet Take 1 tablet by mouth every morning (before breakfast) 90 tablet 1    levothyroxine (SYNTHROID) 100 MCG tablet Take 1 tablet by mouth daily 30 tablet 1    cetirizine (ZYRTEC) 10 MG tablet Take 1 tablet by mouth daily 90 tablet 1    Multiple Vitamins-Minerals (THERAPEUTIC MULTIVITAMIN-MINERALS) tablet Take 1 tablet by mouth daily 90 tablet 1    famotidine (PEPCID) 20 MG tablet Take 1 tablet by mouth 2 times

## 2025-05-12 DIAGNOSIS — E03.9 ACQUIRED HYPOTHYROIDISM: ICD-10-CM

## 2025-05-14 ENCOUNTER — OFFICE VISIT (OUTPATIENT)
Age: 60
End: 2025-05-14
Payer: COMMERCIAL

## 2025-05-14 VITALS
HEIGHT: 64 IN | TEMPERATURE: 97.1 F | WEIGHT: 230.4 LBS | DIASTOLIC BLOOD PRESSURE: 68 MMHG | BODY MASS INDEX: 39.34 KG/M2 | SYSTOLIC BLOOD PRESSURE: 119 MMHG | HEART RATE: 79 BPM

## 2025-05-14 DIAGNOSIS — K21.9 GASTROESOPHAGEAL REFLUX DISEASE WITHOUT ESOPHAGITIS: Primary | ICD-10-CM

## 2025-05-14 DIAGNOSIS — E66.813 CLASS 3 SEVERE OBESITY DUE TO EXCESS CALORIES WITH SERIOUS COMORBIDITY AND BODY MASS INDEX (BMI) OF 40.0 TO 44.9 IN ADULT (HCC): ICD-10-CM

## 2025-05-14 PROCEDURE — 99214 OFFICE O/P EST MOD 30 MIN: CPT | Performed by: CLINICAL NURSE SPECIALIST

## 2025-05-14 RX ORDER — PHENTERMINE HYDROCHLORIDE 37.5 MG/1
TABLET ORAL
Qty: 30 TABLET | Refills: 0 | Status: SHIPPED | OUTPATIENT
Start: 2025-05-14 | End: 2025-06-14

## 2025-05-14 ASSESSMENT — ENCOUNTER SYMPTOMS
NAUSEA: 0
VOMITING: 0
CONSTIPATION: 1
SHORTNESS OF BREATH: 0

## 2025-05-14 NOTE — TELEPHONE ENCOUNTER
Name of Medication(s) Requested:  Requested Prescriptions     Pending Prescriptions Disp Refills    levothyroxine (SYNTHROID) 100 MCG tablet [Pharmacy Med Name: Levothyroxine Sodium Oral Tablet 100 MCG] 30 tablet 0     Sig: TAKE ONE TABLET BY MOUTH DAILY       Medication is on current medication list Yes    Dosage and directions were verified? Yes    Quantity verified: 30 day supply     Pharmacy Verified?  Yes    Last Appointment:  Visit date not found    Future appts:  Future Appointments   Date Time Provider Department Center   5/14/2025  4:15 PM Henrietta Cortez APRN - CNS Surg [facility]   6/11/2025  5:45 PM Henrietta Cortez APRN - CNS Surg Weight [facility]   7/9/2025  5:45 PM Henrietta Cortez APRN - CNS Surg Weight [facility]      Patient supposed to follow up around 05/28. No appointment has been scheduled for primary care. Appointment link sent.     (If no appt send self scheduling link. .REFILLAPPT)  Scheduling request sent?     [] Yes  [] No    Does patient need updated?  [] Yes  [] No

## 2025-05-14 NOTE — PATIENT INSTRUCTIONS
Patient Instructions:    Take phentermine 37.5 mg, one-half to one tablet daily as needed for appetite suppression. Take each dose 30-90 min before effect will be needed.    While taking phentermine, check the Blood Pressure every morning and every evening.     If the systolic BP is >155 mmHg, the diastolic BP is >90 mm/Hg or the heart rate is > 100 beats per minute, do not take phentermine that day.    If the systolic BP is consistently >155 mmHg, the diastolic BP is consistently above 90 mm/Hg or the heart rate is consistently > 100 beats per minute, then stop taking phentermine altogether.    If the systolic BP >170 mmHg or the diastolic BP is >100 mmHg (even if it is only once), then phentermine should be stopped altogether without proving that any of these are consistently elevated.    Side effects include but are not limited to an increased heart rate, elevated blood pressure, dry mouth, insomnia, constipation and nervousness. Patient verbalized understanding and will stop this medication right away if they experience any of these symptoms.      Rules:  Count every calorie every day using MFP   Limit sweets to one day per month  Limit chips/crackers/pretzels/nuts/popcorn to 100-150  bernabe/day  Eliminate all sugar sweetened beverages (including fruit juice)  Limit restaurants (including fast food and food from a convenience store) to one time every two weeks while in town    Requirements:  Make sure protein intake is at least 75 grams per day (do not count protein every day; instead spot check your intake every 2-3 weeks and make sure what you think you are getting is close to accurate; consider using a protein shake if needed; these are in the pharmacy section of the stores, not the grocery section; Premier, Pure Protein and Fairlife are relatively inexpensive and taste good to most patients; other options are Nectar, Boost Max, Ensure Max, BeneProtein and GNC lean (which is lactose-free);   Nectar fruit,

## 2025-05-14 NOTE — PROGRESS NOTES
CC -   GERD , Obesity    BACKGROUND -   Last visit: 4/8/2025  First visit: 04/08/2025    Obesity   Began in 1992 when she was diagnosed with thyroid  problem  Initial BMI 41.88, Wt 240.2 lbs Ht 5'3.5\"  HS Grad wt 135 lbs   Lowest   wt 180 lbs   Highest  wt 252 lbs  Pattern of wt gain: gradual   Wt change past yr: fluctuating up & down 10 lbs  Most wt lost: 50 lbs  healthy eating  Other diets attempted: Keto, Low carb, low sugar     Desire to lose weight: 10/10  Problem posed by appetite: 10/10 Brain hunger~ Boredom eating    Initial Diet:    Number of meals per day - 2    Number of snacks per day - 3    Meal volume - 10\" plate, yes  seconds    Fast food/convenience store - 3-4 x/week    Restaurants (not fast food) - 0 x/week   Sweets - 4 d/week little Corin, donut   Chips - 0 d/week   Crackers/pretzels - 0 d/week   Nuts - 0 d/week   Peanut Butter - 0 d/week   Popcorn - 0 d/week   Dried fruit - 0 d/week   Whole fruit - 0 d/week   Breakfast cereal - 0 d/week   Granola/Protein/Energy bar - 0 d/week   Sugar sweetened beverages - none   Protein - Protein powder ( 23 gm) 5 x week    Fiber - No supplements   Multivitamin -none    Exercise:    Gym membership - no     Walking - yes to from work 5,000-6,000 steps     Running - no    Resistance - yes 2-3 x week     Aerobic class - no  ______________________________    [facility] -  Past Medical History:   Diagnosis Date    BPPV (benign paroxysmal positional vertigo), left     Hypothyroidism      Current Outpatient Medications   Medication Sig Dispense Refill    pantoprazole (PROTONIX) 40 MG tablet Take 1 tablet by mouth every morning (before breakfast) 90 tablet 1    levothyroxine (SYNTHROID) 100 MCG tablet Take 1 tablet by mouth daily 30 tablet 1    cetirizine (ZYRTEC) 10 MG tablet Take 1 tablet by mouth daily 90 tablet 1    Multiple Vitamins-Minerals (THERAPEUTIC MULTIVITAMIN-MINERALS) tablet Take 1 tablet by mouth daily 90 tablet 1    famotidine (PEPCID) 20 MG tablet Take 1

## 2025-05-15 RX ORDER — LEVOTHYROXINE SODIUM 100 UG/1
100 TABLET ORAL DAILY
Qty: 30 TABLET | Refills: 0 | Status: SHIPPED | OUTPATIENT
Start: 2025-05-15

## 2025-05-16 ENCOUNTER — TELEPHONE (OUTPATIENT)
Dept: FAMILY MEDICINE CLINIC | Age: 60
End: 2025-05-16

## 2025-05-16 DIAGNOSIS — E03.9 ACQUIRED HYPOTHYROIDISM: Primary | ICD-10-CM

## 2025-05-16 NOTE — TELEPHONE ENCOUNTER
Patient was advised in March that her TSH levels would need rechecked.     No orders have been placed, please place orders so patient can have labs drawn before appointment 5/23

## 2025-05-17 ENCOUNTER — HOSPITAL ENCOUNTER (OUTPATIENT)
Age: 60
Discharge: HOME | End: 2025-05-17
Payer: COMMERCIAL

## 2025-05-17 DIAGNOSIS — E03.9 ACQUIRED HYPOTHYROIDISM: ICD-10-CM

## 2025-05-17 LAB — TSH SERPL DL<=0.05 MIU/L-ACNC: 1.38 UIU/ML (ref 0.27–4.2)

## 2025-05-17 PROCEDURE — 36415 COLL VENOUS BLD VENIPUNCTURE: CPT

## 2025-05-17 PROCEDURE — 84443 ASSAY THYROID STIM HORMONE: CPT

## 2025-05-23 ENCOUNTER — OFFICE VISIT (OUTPATIENT)
Dept: FAMILY MEDICINE CLINIC | Age: 60
End: 2025-05-23
Payer: COMMERCIAL

## 2025-05-23 VITALS
HEART RATE: 76 BPM | DIASTOLIC BLOOD PRESSURE: 86 MMHG | OXYGEN SATURATION: 97 % | SYSTOLIC BLOOD PRESSURE: 124 MMHG | HEIGHT: 65 IN | BODY MASS INDEX: 37.82 KG/M2 | RESPIRATION RATE: 18 BRPM | WEIGHT: 227 LBS | TEMPERATURE: 97.4 F

## 2025-05-23 DIAGNOSIS — M25.50 ARTHRALGIA, UNSPECIFIED JOINT: ICD-10-CM

## 2025-05-23 DIAGNOSIS — E03.9 ACQUIRED HYPOTHYROIDISM: Primary | ICD-10-CM

## 2025-05-23 LAB
C-REACTIVE PROTEIN: 9.3 MG/L (ref 0–5)
RHEUMATOID FACTOR: <10 IU/ML (ref 0–14)

## 2025-05-23 PROCEDURE — 99213 OFFICE O/P EST LOW 20 MIN: CPT

## 2025-05-23 RX ORDER — LEVOTHYROXINE SODIUM 100 UG/1
100 TABLET ORAL DAILY
Qty: 90 TABLET | Refills: 1 | Status: SHIPPED | OUTPATIENT
Start: 2025-05-23

## 2025-05-23 NOTE — PROGRESS NOTES
S: 59 y.o. female with   Chief Complaint   Patient presents with    Joint Swelling     For about 1 month        Pt is here for follow up on her hypothyroidism.  She is stable on her current dose.  She has been having some joint swelling for the last few weeks - joint swelling in hands and knees.      O: VS:  height is 1.651 m (5' 5\") and weight is 103 kg (227 lb). Her temporal temperature is 97.4 °F (36.3 °C). Her blood pressure is 124/86 and her pulse is 76. Her respiration is 18 and oxygen saturation is 97%.   BP Readings from Last 3 Encounters:   05/23/25 124/86   05/14/25 119/68   04/08/25 (!) 140/70     See resident note      Impression/Plan:   1) hypothyroidism - check in 6 mn.  2) hand stiffness with swelling - labs for RA.      Health Maintenance Due   Topic Date Due    Shingles vaccine (1 of 2) Never done    Pneumococcal 50+ years Vaccine (1 of 1 - PCV) Never done    Respiratory Syncytial Virus (RSV) Pregnant or age 60 yrs+ (1 - Risk 60-74 years 1-dose series) Never done    COVID-19 Vaccine (1 - 2024-25 season) Never done    Breast cancer screen  02/24/2025         Attending Physician Statement  I have discussed the case, including pertinent history and exam findings with the resident.  I agree with the documented assessment and plan.      Manasa Gauthier MD  
palpitations.   Gastrointestinal:  Negative for abdominal pain, constipation, diarrhea, nausea and vomiting.   Genitourinary:  Negative for dysuria and frequency.   Musculoskeletal:  Positive for arthralgias. Negative for gait problem and joint swelling.   Skin:  Negative for rash and wound.   Hematological:  Does not bruise/bleed easily.   All other systems reviewed and are negative.      PHYSICAL EXAM   Vitals: /86   Pulse 76   Temp 97.4 °F (36.3 °C) (Temporal)   Resp 18   Ht 1.651 m (5' 5\")   Wt 103 kg (227 lb)   LMP 03/15/2015   SpO2 97%   BMI 37.77 kg/m²   Physical Exam  Vitals reviewed.   Constitutional:       Appearance: Normal appearance.   HENT:      Head: Normocephalic and atraumatic.      Nose: Nose normal.      Mouth/Throat:      Mouth: Mucous membranes are moist.   Eyes:      Pupils: Pupils are equal, round, and reactive to light.   Cardiovascular:      Rate and Rhythm: Normal rate and regular rhythm.      Pulses: Normal pulses.      Heart sounds: Normal heart sounds.   Pulmonary:      Effort: Pulmonary effort is normal.      Breath sounds: Normal breath sounds.   Abdominal:      General: Bowel sounds are normal. There is no distension.      Palpations: Abdomen is soft.      Tenderness: There is no abdominal tenderness. There is no guarding.   Musculoskeletal:         General: Normal range of motion.      Cervical back: Normal range of motion. No rigidity.      Right lower leg: No edema.      Left lower leg: No edema.      Comments: Crepitus noted on bilateral knee joints.  Negative tenderness present on joint lines.  Tenderness noted on proximal interphalangeal joints.   Skin:     General: Skin is warm.   Neurological:      General: No focal deficit present.      Mental Status: She is alert and oriented to person, place, and time.   Psychiatric:         Mood and Affect: Mood normal.           ASSESSMENT AND PLAN     1. Acquired hypothyroidism  Controlled, continue medication, will check

## 2025-05-24 LAB — SED RATE, AUTOMATED: 5 MM/HR (ref 0–20)

## 2025-05-27 ENCOUNTER — RESULTS FOLLOW-UP (OUTPATIENT)
Dept: FAMILY MEDICINE CLINIC | Age: 60
End: 2025-05-27

## 2025-05-27 LAB — CCP IGG ANTIBODIES: 3.2 U/ML (ref 0–7)

## 2025-06-11 ENCOUNTER — OFFICE VISIT (OUTPATIENT)
Age: 60
End: 2025-06-11
Payer: COMMERCIAL

## 2025-06-11 VITALS
BODY MASS INDEX: 39.61 KG/M2 | DIASTOLIC BLOOD PRESSURE: 81 MMHG | TEMPERATURE: 97.3 F | SYSTOLIC BLOOD PRESSURE: 143 MMHG | HEART RATE: 77 BPM | HEIGHT: 64 IN | WEIGHT: 232 LBS

## 2025-06-11 DIAGNOSIS — K21.9 GASTROESOPHAGEAL REFLUX DISEASE WITHOUT ESOPHAGITIS: Primary | ICD-10-CM

## 2025-06-11 DIAGNOSIS — E66.813 CLASS 3 SEVERE OBESITY DUE TO EXCESS CALORIES WITH SERIOUS COMORBIDITY AND BODY MASS INDEX (BMI) OF 40.0 TO 44.9 IN ADULT (HCC): ICD-10-CM

## 2025-06-11 PROCEDURE — 99214 OFFICE O/P EST MOD 30 MIN: CPT | Performed by: CLINICAL NURSE SPECIALIST

## 2025-06-11 RX ORDER — PHENTERMINE HYDROCHLORIDE 37.5 MG/1
TABLET ORAL
Qty: 30 TABLET | Refills: 0 | Status: SHIPPED | OUTPATIENT
Start: 2025-06-11 | End: 2025-07-11

## 2025-06-11 ASSESSMENT — ENCOUNTER SYMPTOMS
NAUSEA: 0
CONSTIPATION: 1
SHORTNESS OF BREATH: 0
VOMITING: 0

## 2025-06-11 NOTE — PATIENT INSTRUCTIONS
Boost Max, Ensure Max, BeneProtein and GNC lean (which is lactose-free);   Nectar fruit, Premier Protein Clear, IsoPure Protein Drink, and Protein 2 O are water-based options; Quest (or Cosco, which is cheaper and is ordered on Amazon) and the [employer] 1 protein bars can also be used, but have less protein in them )    (Disclaimer: Dietary supplements rarely have their listed ingredients and the amount of each verified by a third party other. Sometimes they give verification for their claims to be GMO and gluten free and to be organic. However, even such verifications as these may still be untrustworthy.)    Make sure fiber intake is at least 22 grams/day. Do this in part or whole by taking 12 tablespoons of [employer]'s Fiber One original, plain cereal (or Kiveda's All Bran Buds cereal) or 4 tablespoons of wheat dextrin powder (Benefiber or generic brand). For both of these, start with 1/8th - 1/4th the target amount and every week add another 1/8th - 1/4th until reaching the target).  Also, fiber gummies containing inulin (such as Nature Made, Perry, Benefiber) or Fiber Choice Pre-biotic tablets containing inulin are options. 1  cup of beans (not green beans, barbeque beans, baked beans or pork and beans) or peas is an excellent food source of fiber. Low calorie, high fiber bread products containing modified wheat starch can be used. An example is the [employer] Xtreme Wellness High Fiber Carb Lean tortilla (7grams in 30 calories).  All of these fiber supplements are for the health of the colon. Their purpose is not to prevent or treat constipation.    Drink at least 64 oz of water each day  Take one multivitamin every day    Targets:  Limit calorie intake to 1300 calories/day  Walk 30 minutes daily  Avoid eating 2 hours within bedtime.     Tips:  Do not eat outside of the dining room or the kitchen  Do not eat while watching TV, videos, working on the computer or using a smart phone  Do not eat food

## 2025-06-11 NOTE — PROGRESS NOTES
ordered on Amazon) and the Tile 1 protein bars can also be used, but have less protein in them )    (Disclaimer: Dietary supplements rarely have their listed ingredients and the amount of each verified by a third party other. Sometimes they give verification for their claims to be GMO and gluten free and to be organic. However, even such verifications as these may still be untrustworthy.)    Make sure fiber intake is at least 22 grams/day. Do this in part or whole by taking 12 tablespoons of [employer]'s Fiber One original, plain cereal (or C[employer]'s All Bran Buds cereal) or 4 tablespoons of wheat dextrin powder (Benefiber or generic brand). For both of these, start with 1/8th - 1/4th the target amount and every week add another 1/8th - 1/4th until reaching the target).  Also, fiber gummies containing inulin (such as Nature Made, Perry, Benefiber) or Fiber Choice Pre-biotic tablets containing inulin are options. 1  cup of beans (not green beans, barbeque beans, baked beans or pork and beans) or peas is an excellent food source of fiber. Low calorie, high fiber bread products containing modified wheat starch can be used. An example is the [employer] Xtreme Wellness High Fiber Carb Lean tortilla (7grams in 30 calories).  All of these fiber supplements are for the health of the colon. Their purpose is not to prevent or treat constipation.    Drink at least 64 oz of water each day  Take one multivitamin every day    Targets:  Limit calorie intake to 1300 calories/day  Walk 30 minutes daily  Avoid eating 2 hours within bedtime.     Tips:  Do not eat outside of the dining room or the kitchen  Do not eat while watching TV, videos, working on the computer or using a smart phone  Do not eat food out of a multi-serving bag or container.    Return in about 1 month (around 7/11/2025).     Total time spent on encounter:30 minutes     RAMIN Smart - CNS  Obesity  6/11/2025

## 2025-07-09 ENCOUNTER — OFFICE VISIT (OUTPATIENT)
Age: 60
End: 2025-07-09
Payer: COMMERCIAL

## 2025-07-09 VITALS
BODY MASS INDEX: 39.5 KG/M2 | SYSTOLIC BLOOD PRESSURE: 138 MMHG | HEART RATE: 77 BPM | HEIGHT: 64 IN | DIASTOLIC BLOOD PRESSURE: 63 MMHG | WEIGHT: 231.4 LBS | TEMPERATURE: 97.2 F

## 2025-07-09 DIAGNOSIS — K21.9 GASTROESOPHAGEAL REFLUX DISEASE WITHOUT ESOPHAGITIS: Primary | ICD-10-CM

## 2025-07-09 DIAGNOSIS — E66.813 CLASS 3 SEVERE OBESITY DUE TO EXCESS CALORIES WITH SERIOUS COMORBIDITY AND BODY MASS INDEX (BMI) OF 40.0 TO 44.9 IN ADULT (HCC): ICD-10-CM

## 2025-07-09 PROCEDURE — 99214 OFFICE O/P EST MOD 30 MIN: CPT | Performed by: CLINICAL NURSE SPECIALIST

## 2025-07-09 RX ORDER — PHENTERMINE AND TOPIRAMATE 7.5; 46 MG/1; MG/1
1 CAPSULE, EXTENDED RELEASE ORAL DAILY
Qty: 30 CAPSULE | Refills: 2 | Status: SHIPPED | OUTPATIENT
Start: 2025-07-09 | End: 2025-08-08

## 2025-07-09 ASSESSMENT — ENCOUNTER SYMPTOMS
NAUSEA: 1
VOMITING: 0
SHORTNESS OF BREATH: 0
CONSTIPATION: 1

## 2025-07-09 NOTE — PATIENT INSTRUCTIONS
Patient Instructions:    Take Qsymia 7.5/46 mg, one tablet daily     While taking Qsymia, check the Blood Pressure every morning and every evening.     If the systolic BP is >155 mmHg, the diastolic BP is >90 mm/Hg or the heart rate is > 100 beats per minute, do not take Qsymia that day.    If the systolic BP is consistently >155 mmHg, the diastolic BP is consistently above 90 mm/Hg or the heart rate is consistently > 100 beats per minute, then stop taking Qsymia altogether (contact me for weaning instructions)    If the systolic BP >170 mmHg or the diastolic BP is >100 mmHg (even if it is only once), then Qsymia should be stopped altogether without proving that any of these are consistently elevated (contact me for weaning instructions)    Rules:  Count every calorie every day using MFP   Limit sweets to one day per month  Limit chips/crackers/pretzels/nuts/popcorn to 100-150  bernabe/day  Eliminate all sugar sweetened beverages (including fruit juice)  Limit restaurants (including fast food and food from a convenience store) to one time every two weeks while in town    Requirements:  Make sure protein intake is at least 75 grams per day (do not count protein every day; instead spot check your intake every 2-3 weeks and make sure what you think you are getting is close to accurate; consider using a protein shake if needed; these are in the pharmacy section of the stores, not the grocery section; Premier, Pure Protein and Fairlife are relatively inexpensive and taste good to most patients; other options are Nectar, Boost Max, Ensure Max, BeneProtein and GNC lean (which is lactose-free);   Nectar fruit, Premier Protein Clear, IsoPure Protein Drink, and Protein 2 O are water-based options; Quest (or Cosco, which is cheaper and is ordered on Amazon) and the [employer] 1 protein bars can also be used, but have less protein in them )    (Disclaimer: Dietary supplements rarely have their listed ingredients and the amount of each

## 2025-07-09 NOTE — PROGRESS NOTES
CC -   GERD , Obesity    BACKGROUND -   Last visit: 6/11/2025  First visit: 04/08/2025    Obesity   Began in 1992 when she was diagnosed with thyroid  problem  Initial BMI 41.88, Wt 240.2 lbs Ht 5'3.5\"  HS Grad wt 135 lbs   Lowest   wt 180 lbs   Highest  wt 252 lbs  Pattern of wt gain: gradual   Wt change past yr: fluctuating up & down 10 lbs  Most wt lost: 50 lbs  healthy eating  Other diets attempted: Keto, Low carb, low sugar     Desire to lose weight: 10/10  Problem posed by appetite: 10/10 Brain hunger~ Boredom eating    Initial Diet:    Number of meals per day - 2    Number of snacks per day - 3    Meal volume - 10\" plate, yes  seconds    Fast food/convenience store - 3-4 x/week    Restaurants (not fast food) - 0 x/week   Sweets - 4 d/week little Corin, donut   Chips - 0 d/week   Crackers/pretzels - 0 d/week   Nuts - 0 d/week   Peanut Butter - 0 d/week   Popcorn - 0 d/week   Dried fruit - 0 d/week   Whole fruit - 0 d/week   Breakfast cereal - 0 d/week   Granola/Protein/Energy bar - 0 d/week   Sugar sweetened beverages - none   Protein - Protein powder ( 23 gm) 5 x week    Fiber - No supplements   Multivitamin -none    Exercise:    Gym membership - no     Walking - yes to from work 5,000-6,000 steps     Running - no    Resistance - yes 2-3 x week     Aerobic class - no  ______________________________    [facility] -  Past Medical History:   Diagnosis Date    BPPV (benign paroxysmal positional vertigo), left     Hypothyroidism      Current Outpatient Medications   Medication Sig Dispense Refill    phentermine (ADIPEX-P) 37.5 MG tablet Take phentermine 37.5 mg, one-half to one tablet daily  Take each dose 30-90 min before effect will be needed. 30 tablet 0    levothyroxine (SYNTHROID) 100 MCG tablet Take 1 tablet by mouth daily 90 tablet 1    pantoprazole (PROTONIX) 40 MG tablet Take 1 tablet by mouth every morning (before breakfast) 90 tablet 1    cetirizine (ZYRTEC) 10 MG tablet Take 1 tablet by mouth daily 90

## 2025-07-15 DIAGNOSIS — K21.9 GASTROESOPHAGEAL REFLUX DISEASE WITHOUT ESOPHAGITIS: ICD-10-CM

## 2025-07-15 DIAGNOSIS — E66.813 CLASS 3 SEVERE OBESITY DUE TO EXCESS CALORIES WITH SERIOUS COMORBIDITY AND BODY MASS INDEX (BMI) OF 40.0 TO 44.9 IN ADULT (HCC): ICD-10-CM

## 2025-07-15 RX ORDER — PHENTERMINE AND TOPIRAMATE EXTENDED-RELEASE 7.5; 46 MG/1; MG/1
1 CAPSULE ORAL DAILY
Qty: 30 CAPSULE | Refills: 2 | Status: SHIPPED | OUTPATIENT
Start: 2025-07-15 | End: 2025-08-14

## 2025-07-22 DIAGNOSIS — K21.9 GASTROESOPHAGEAL REFLUX DISEASE WITHOUT ESOPHAGITIS: ICD-10-CM

## 2025-07-22 DIAGNOSIS — E66.813 CLASS 3 SEVERE OBESITY DUE TO EXCESS CALORIES WITH SERIOUS COMORBIDITY AND BODY MASS INDEX (BMI) OF 40.0 TO 44.9 IN ADULT (HCC): ICD-10-CM

## 2025-07-22 RX ORDER — PHENTERMINE AND TOPIRAMATE EXTENDED-RELEASE 7.5; 46 MG/1; MG/1
1 CAPSULE ORAL DAILY
Qty: 30 CAPSULE | Refills: 2 | Status: SHIPPED | OUTPATIENT
Start: 2025-07-22 | End: 2025-08-21